# Patient Record
Sex: FEMALE | Race: OTHER | Employment: FULL TIME | URBAN - METROPOLITAN AREA
[De-identification: names, ages, dates, MRNs, and addresses within clinical notes are randomized per-mention and may not be internally consistent; named-entity substitution may affect disease eponyms.]

---

## 2017-04-11 ENCOUNTER — OFFICE VISIT (OUTPATIENT)
Dept: FAMILY MEDICINE CLINIC | Facility: CLINIC | Age: 47
End: 2017-04-11

## 2017-04-11 VITALS
DIASTOLIC BLOOD PRESSURE: 67 MMHG | BODY MASS INDEX: 39.2 KG/M2 | SYSTOLIC BLOOD PRESSURE: 103 MMHG | WEIGHT: 213 LBS | TEMPERATURE: 98.7 F | HEART RATE: 90 BPM | RESPIRATION RATE: 18 BRPM | HEIGHT: 62 IN | OXYGEN SATURATION: 97 %

## 2017-04-11 DIAGNOSIS — J20.9 ACUTE BRONCHITIS, UNSPECIFIED ORGANISM: ICD-10-CM

## 2017-04-11 DIAGNOSIS — R68.89 FLU-LIKE SYMPTOMS: ICD-10-CM

## 2017-04-11 DIAGNOSIS — E55.9 VITAMIN D DEFICIENCY: ICD-10-CM

## 2017-04-11 DIAGNOSIS — H61.23 BILATERAL IMPACTED CERUMEN: ICD-10-CM

## 2017-04-11 DIAGNOSIS — Z00.00 ROUTINE GENERAL MEDICAL EXAMINATION AT A HEALTH CARE FACILITY: Primary | ICD-10-CM

## 2017-04-11 LAB
FLUAV+FLUBV AG NOSE QL IA.RAPID: NEGATIVE POS/NEG
FLUAV+FLUBV AG NOSE QL IA.RAPID: NEGATIVE POS/NEG
S PYO AG THROAT QL: NEGATIVE
VALID INTERNAL CONTROL?: YES
VALID INTERNAL CONTROL?: YES

## 2017-04-11 RX ORDER — LORAZEPAM 0.5 MG/1
TABLET ORAL
Refills: 0 | COMMUNITY
Start: 2017-03-21 | End: 2018-02-13

## 2017-04-11 RX ORDER — NAPROXEN 375 MG/1
375 TABLET ORAL 2 TIMES DAILY WITH MEALS
Qty: 180 TAB | Refills: 3 | Status: SHIPPED | OUTPATIENT
Start: 2017-04-11 | End: 2018-02-13

## 2017-04-11 RX ORDER — HYDROCODONE POLISTIREX AND CHLORPHENIRAMINE POLISTIREX 10; 8 MG/5ML; MG/5ML
1 SUSPENSION, EXTENDED RELEASE ORAL
Qty: 115 ML | Refills: 0 | Status: SHIPPED | OUTPATIENT
Start: 2017-04-11 | End: 2017-08-18 | Stop reason: ALTCHOICE

## 2017-04-11 NOTE — PROGRESS NOTES
Edita Tam is a 50 y.o. female presents today for chest cough, chills, headache, and sore throat since yesterday. Patient reports that she had no appetite yesterday. Patient is not fasting. Pt is in Room # 2        1. Have you been to the ER, urgent care clinic since your last visit? Hospitalized since your last visit? No    2. Have you seen or consulted any other health care providers outside of the [address] [place] [address] since your last visit? Include any pap smears or colon screening.  No      reviewed:

## 2017-04-11 NOTE — PROGRESS NOTES
History and Physical    Today's Date:  2017   Patient's Name: Melanie Milner   Patient's :  1970     History:     Chief Complaint   Patient presents with    Cough    Chills    Headache    Sore Throat     Cough   This is an acute problem. This is not at goal. Symptoms started two days ago. Pt denies fever, sick contacts or rhinorrhea.  +nasal congestion. +h/o allergies    Obesity Class II  This is a chronic problem. This is not at goal. Pt exercises regularly. Pt eats a healthy diet. Hypovitaminosis D  This is a chronic problem. This is not at goal. Vit D level was checked on 2014. Pt is on vitamin D supplements. Past Medical History:   Diagnosis Date    Acute bronchitis 2017    Acute right-sided thoracic back pain 2016    Breast pain, right 12/10/2014    Excess ear wax 2014    Fatigue 2014    Fibrocystic breast disease in female 2014    Iron deficiency anemia 12/10/2014    Menorrhagia 2016    Neck pain 2016    Obesity, Class II, BMI 35-39.9, no comorbidity ([address] 2014    Pollen allergies 2014    Skin tag 2014    Tinea corporis 2014    Vitamin D deficiency 2014     Past Surgical History:   Procedure Laterality Date    HX BREAST REDUCTION Bilateral 2015    BREAST REDUCTION - bilateral performed by Lorraine Conde [facility] at [facility] MA[facility] OR    HX GI      cholecystectomy    HX GYN      C section x2      reports that she has never smoked. She has never used smokeless tobacco. She reports that she drinks alcohol. She reports that she does not use illicit drugs.   Family History   Problem Relation Age of Onset    No Known Problems Mother     No Known Problems Father     No Known Problems Sister     No Known Problems Brother     No Known Problems Maternal Grandmother     Diabetes Paternal Grandmother     Lung Disease Paternal Grandfather      pneumonia    No Known Problems Brother      Allergies   Allergen Reactions  Hydrocodone-Acetaminophen Itching     Patient denies being allergic to this medication    Mustard Hives     Problem List:      Patient Active Problem List   Diagnosis Code    Obesity, Class II, BMI 35-39.9, no comorbidity ([facility]) E66.9    Pollen allergies J30.1    Vitamin D deficiency E55.9    Skin tag L91.8    Excess ear wax H61.20    Fibrocystic breast disease in female N62.18    Iron deficiency anemia D50.9    Menorrhagia N92.0    Acute bronchitis J20.9     Medications:     Current Outpatient Prescriptions   Medication Sig    LORazepam (ATIVAN) 0.5 mg tablet TK 1 T PO QD PRN    chlorpheniramine-HYDROcodone (TUSSIONEX) 10-8 mg/5 mL suspension Take 5 mL by mouth every twelve (12) hours as needed for Cough. Max Daily Amount: 10 mL.  carbamide peroxide (DEBROX) 6.5 % otic solution Administer 5 Drops into each ear two (2) times a day.  naproxen (NAPROSYN) 375 mg tablet Take 1 Tab by mouth two (2) times daily (with meals).  diazepam (VALIUM) 10 mg tablet Take 1 Tab by mouth every six (6) hours as needed for Anxiety. Max Daily Amount: 40 mg.    ferrous sulfate (IRON) 325 mg (65 mg iron) tablet Take 1 Tab by mouth Daily (before breakfast).  ascorbic acid (VITAMIN C) 100 mg tab Take 1 Tab by mouth three (3) times daily.  ergocalciferol (ERGOCALCIFEROL) 50,000 unit capsule Take 1 Cap by mouth every seven (7) days.  multivitamin (ONE A DAY) tablet Take 1 Tab by mouth daily. No current facility-administered medications for this visit.       Review of Systems:   (Positives in bold)   General:   fevers, chills, generalized weakness, fatigue, weight change, night sweats, appetite decreased  Neurologic: dizziness, lightheadedness, headaches, loss of consciousness, numbness, tingling, focal weakness  Eyes:  vision changes, double vision, photophobia  Ears:  change in hearing, ear pain, ear discharge, ear ringing  Nose:  sneezing, runny nose, nasal congestion  Mouth/Throat: sore throat, voice change, dry mouth, difficulty swallowing  Neck:  pain, stiffness, swelling  Respiratory: dyspnea at rest, dyspnea on exertion, wheezing, cough, sputum production  Cardiovascular:   chest pain, palpitations, pedal edema, leg cramps  Breasts: lumps, discharge, pain, rash, skin changes, changes on self-exam  Gastrointestinal:  nausea, vomiting, abdominal pain, constipation, diarrhea, heart burn, bloody stools, tarry black stools, rectal pain, hemorrhoids  Urinary: dysuria, urinary frequency, nocturia, malodorous urine  Genital (F): vaginal discharge, ulcerations, rashes, change in menses, pelvic pain  Musculoskeletal:  joint pain, joint stiffness, joint swelling, back pain, focal muscle pain, diffuse myalgias  Psychiatric: insomnia, anxiety, depression, hallucinations, suicidal ideation, homicidal ideation  Endocrine: polydipsia, polyuria, polyphagia, cold intolerance, heat intolerance  Hematologic: easy bruising, easy bleeding  Dermatologic: Itching, rash    Physical Assessment:   VS:    Visit Vitals    /67 (BP 1 Location: Right arm, BP Patient Position: Sitting)    Pulse 90    Temp 98.7 °F (37.1 °C) (Oral)    Resp 18    Ht 5' 2\" (1.575 m)    Wt 213 lb (96.6 kg)    LMP 03/20/2017    SpO2 97%    BMI 38.96 kg/m2     General:   Well-groomed, well-nourished, in no distress, pleasant, alert, appropriate and conversant. Eyes:    PERRL, EOMI  Ears:  TMs could not be visualized, +ear wax bilaterally  Mouth:  MMM, good dentition, oropharynx WNL without membranes, exudates, petechiae or ulcers  Neck:   Neck supple, no swelling, mass or tenderness  Cardiovascular:   No JVD. RRR, no MRG. Pulmonary:   Lungs clear bilaterally. Normal respiratory effort. Abdomen:   Abdomen soft, NT, ND, NAB. Extremities:   No edema, LEs warm and well-perfused. Neuro:   Alert and oriented, no focal deficits. No facial asymmetry noted.   Skin:    No rash or jaundice  MSK:   Normal ROM, 5/5 muscle strength  Psych:  No pressured speech or abnormal thought content    Strep test: neg  Flu A/B: neg    Assessment/Plan & Orders:         ICD-10-CM ICD-9-CM    1. Routine general medical examination at a health care facility Z00.00 V70.0 CBC WITH AUTOMATED DIFF      LIPID PANEL      METABOLIC PANEL, COMPREHENSIVE      TSH 3RD GENERATION      T4, FREE      HEMOGLOBIN A1C WITH EAG   2. Acute bronchitis, unspecified organism J20.9 466.0 chlorpheniramine-HYDROcodone (TUSSIONEX) 10-8 mg/5 mL suspension   3. Flu-like symptoms R68.89 780.99 AMB POC RAPID STREP A      AMB POC KRYSTAL INFLUENZA A/B TEST   4. Bilateral impacted cerumen H61.23 380.4 carbamide peroxide (DEBROX) 6.5 % otic solution   5. Vitamin D deficiency E55.9 268.9 VITAMIN D, 25 HYDROXY     HM  Colon cancer: colonoscopy due at age 46  Dyslipidemia: will check FLP and CMP  Diabetes mellitus: will check FBG  Influenza vaccine: due, pt declines  Pneumococcal vaccine: due at age 70  Tdap:   Herpes Zoster vaccine: due at age 59  Hep B vaccine: not indicated (liver dz, DM 19-59)  Weight:  Body mass index is 38.96 kg/(m^2). Discussed the patient's BMI with her. The BMI follow up plan is as follows: Improve diet and 30 min of moderate activity at least 5 times a week  Cervical cancer:  pap smear , due 2017  Breast Cancer: mammogram due, pt will schedule  Osteoporosis: No indication for DEXA scan     reviewed  Advised pt not to take tussionex with her benzodiazepines as these medications may cause her to feel drowsy. Pt agreed    Follow-up Disposition:  Return in about 2 months (around 2017) for Well woman exam, 30 minutes, Go over lab/imaging results, Follow up. *Patient verbalized understanding and agreement with the plan. Patient was given an after-visit summary. Aliza Márquez.  Guanako Breen MD - Internal Medicine  2017, 3:09 PM  [facility]  [address] [place], [address]  Phone [phone]  Fax [phone]

## 2017-04-11 NOTE — PATIENT INSTRUCTIONS
Bronchitis: Care Instructions  Your Care Instructions    Bronchitis is inflammation of the bronchial tubes, which carry air to the lungs. The tubes swell and produce mucus, or phlegm. The mucus and inflamed bronchial tubes make you cough. You may have trouble breathing. Most cases of bronchitis are caused by viruses like those that cause colds. Antibiotics usually do not help and they may be harmful. Bronchitis usually develops rapidly and lasts about 2 to 3 weeks in otherwise healthy people. Follow-up care is a key part of your treatment and safety. Be sure to make and go to all appointments, and call your doctor if you are having problems. It's also a good idea to know your test results and keep a list of the medicines you take. How can you care for yourself at home? · Take all medicines exactly as prescribed. Call your doctor if you think you are having a problem with your medicine. · Get some extra rest.  · Take an over-the-counter pain medicine, such as acetaminophen (Tylenol), ibuprofen (Advil, Motrin), or naproxen (Aleve) to reduce fever and relieve body aches. Read and follow all instructions on the label. · Do not take two or more pain medicines at the same time unless the doctor told you to. Many pain medicines have acetaminophen, which is Tylenol. Too much acetaminophen (Tylenol) can be harmful. · Take an over-the-counter cough medicine that contains dextromethorphan to help quiet a dry, hacking cough so that you can sleep. Avoid cough medicines that have more than one active ingredient. Read and follow all instructions on the label. · Breathe moist air from a humidifier, hot shower, or sink filled with hot water. The heat and moisture will thin mucus so you can cough it out. · Do not smoke. Smoking can make bronchitis worse. If you need help quitting, talk to your doctor about stop-smoking programs and medicines. These can increase your chances of quitting for good.   When should you call for help? Call 911 anytime you think you may need emergency care. For example, call if:  · You have severe trouble breathing. Call your doctor now or seek immediate medical care if:  · You have new or worse trouble breathing. · You cough up dark brown or bloody mucus (sputum). · You have a new or higher fever. · You have a new rash. Watch closely for changes in your health, and be sure to contact your doctor if:  · You cough more deeply or more often, especially if you notice more mucus or a change in the color of your mucus. · You are not getting better as expected. Where can you learn more? Go to http://donavan-ty.info/. Enter H333 in the search box to learn more about \"Bronchitis: Care Instructions. \"  Current as of: May 23, 2016  Content Version: 11.2  © 8448-8330 [employer]. Care instructions adapted under license by [employer] (which disclaims liability or warranty for this information). If you have questions about a medical condition or this instruction, always ask your healthcare professional. [address] any warranty or liability for your use of this information.

## 2017-04-11 NOTE — MR AVS SNAPSHOT
Visit Information Date & Time Provider Department Dept. Phone Encounter #  
 4/11/2017  2:45 PM Weldon Felty, MD [facility] [phone] [id number] Follow-up Instructions Return in about 2 months (around 6/11/2017) for Well woman exam, 30 minutes, Go over lab/imaging results, Follow up. Upcoming Health Maintenance Date Due  
 PAP AKA CERVICAL CYTOLOGY 6/26/2017 DTaP/Tdap/Td series (2 - Td) 6/23/2026 Allergies as of 4/11/2017  Review Complete On: 4/11/2017 By: Alvino Bob LPN Severity Noted Reaction Type Reactions Hydrocodone-acetaminophen  06/28/2016    Itching Patient denies being allergic to this medication Mustard  06/26/2014    Hives Current Immunizations  Never Reviewed Name Date Rubella Virus Vaccine 5/16/2008 12:00 AM  
  
 Not reviewed this visit You Were Diagnosed With   
  
 Codes Comments Routine general medical examination at a health care facility    -  Primary ICD-10-CM: Z00.00 ICD-9-CM: V70.0 Acute bronchitis, unspecified organism     ICD-10-CM: J20.9 ICD-9-CM: 466.0 Flu-like symptoms     ICD-10-CM: R68.89 ICD-9-CM: 780.99 Bilateral impacted cerumen     ICD-10-CM: H61.23 
ICD-9-CM: 380.4 Vitals BP Pulse Temp Resp Height(growth percentile) Weight(growth percentile) 103/67 (BP 1 Location: Right arm, BP Patient Position: Sitting) 90 98.7 °F (37.1 °C) (Oral) 18 5' 2\" (1.575 m) 213 lb (96.6 kg) LMP SpO2 BMI OB Status Smoking Status 03/20/2017 97% 38.96 kg/m2 Having regular periods Never Smoker BMI and BSA Data Body Mass Index Body Surface Area  
 38.96 kg/m 2 2.06 m 2 Preferred Pharmacy Pharmacy Name Phone [facility] [place] [address] [phone] Your Updated Medication List  
  
   
This list is accurate as of: 4/11/17  3:47 PM.  Always use your most recent med list.  
  
 acyclovir 5 % ointment Commonly known as:  ZOVIRAX Apply  to affected area every three (3) hours. ascorbic acid (vitamin C) 100 mg tab Commonly known as:  VITAMIN C Take 1 Tab by mouth three (3) times daily. carbamide peroxide 6.5 % otic solution Commonly known as:  Sheryl Bar Administer 5 Drops into each ear two (2) times a day. chlorpheniramine-HYDROcodone 10-8 mg/5 mL suspension Commonly known as:  Lyric Muprhy Take 5 mL by mouth every twelve (12) hours as needed for Cough. Max Daily Amount: 10 mL. diazePAM 10 mg tablet Commonly known as:  VALIUM Take 1 Tab by mouth every six (6) hours as needed for Anxiety. Max Daily Amount: 40 mg.  
  
 ergocalciferol 50,000 unit capsule Commonly known as:  ERGOCALCIFEROL Take 1 Cap by mouth every seven (7) days. ferrous sulfate 325 mg (65 mg iron) tablet Commonly known as:  Iron Take 1 Tab by mouth Daily (before breakfast). LORazepam 0.5 mg tablet Commonly known as:  ATIVAN TK 1 T PO QD PRN  
  
 multivitamin tablet Commonly known as:  ONE A DAY Take 1 Tab by mouth daily. naproxen 375 mg tablet Commonly known as:  NAPROSYN Take 1 Tab by mouth two (2) times daily (with meals). phentermine 37.5 mg tablet Commonly known as:  ADIPEX-P Take 37.5 mg by mouth every morning. Indications: WEIGHT LOSS MANAGEMENT FOR OBESE PATIENT (BMI >= 30) predniSONE 10 mg tablet Commonly known as:  DELTASONE  
4 tabs for 2 days, then 3 tabs for 2 days, then 2 tabs for 1 day, then 1 tab until gone. Prescriptions Printed Refills  
 chlorpheniramine-HYDROcodone (TUSSIONEX) 10-8 mg/5 mL suspension 0 Sig: Take 5 mL by mouth every twelve (12) hours as needed for Cough. Max Daily Amount: 10 mL. Class: Print Route: Oral  
  
Prescriptions Sent to Pharmacy  Refills  
 carbamide peroxide (DEBROX) 6.5 % otic solution 3  
 Sig: Administer 5 Drops into each ear two (2) times a day. Class: Normal  
 Pharmacy: [facility] [address] Ph #: [phone] Route: Both Ears  
 naproxen (NAPROSYN) 375 mg tablet 3 Sig: Take 1 Tab by mouth two (2) times daily (with meals). Class: Normal  
 Pharmacy: [facility] [address] Ph #: [phone] Route: Oral  
  
We Performed the Following AMB POC RAPID STREP A [15156 CPT(R)] AMB POC KRYSTAL INFLUENZA A/B TEST [73006 CPT(R)] Follow-up Instructions Return in about 2 months (around 6/11/2017) for Well woman exam, 30 minutes, Go over lab/imaging results, Follow up. To-Do List   
 04/11/2017 Lab:  HEMOGLOBIN A1C WITH EAG   
  
 04/11/2017 Lab:  T4, FREE   
  
 04/11/2017 Lab:  TSH 3RD GENERATION   
  
 04/11/2017 Lab:  VITAMIN D, 25 HYDROXY   
  
 04/14/2017 Lab:  CBC WITH AUTOMATED DIFF   
  
 04/14/2017 Lab:  LIPID PANEL   
  
 04/14/2017 Lab:  METABOLIC PANEL, COMPREHENSIVE Introducing [facility]! Marleen Schwartz introduces [employer] patient portal. Now you can access parts of your medical record, email your doctor's office, and request medication refills online. 1. In your internet browser, go to [email or web address] 2. Click on the First Time User? Click Here link in the Sign In box. You will see the New Member Sign Up page. 3. Enter your [employer] Access Code exactly as it appears below. You will not need to use this code after youve completed the sign-up process. If you do not sign up before the expiration date, you must request a new code. · [employer] Access Code: [email or web address] Expires: 7/10/2017  3:46 PM 
 
4. Enter the last four digits of your Social Security Number (xxxx) and Date of Birth (mm/dd/yyyy) as indicated and click Submit.  You will be taken to the next sign-up page. 5. Create a [employer] ID. This will be your [employer] login ID and cannot be changed, so think of one that is secure and easy to remember. 6. Create a [employer] password. You can change your password at any time. 7. Enter your Password Reset Question and Answer. This can be used at a later time if you forget your password. 8. Enter your e-mail address. You will receive e-mail notification when new information is available in [address] 9. Click Sign Up. You can now view and download portions of your medical record. 10. Click the Download Summary menu link to download a portable copy of your medical information. If you have questions, please visit the Frequently Asked Questions section of the [employer] website. Remember, [employer] is NOT to be used for urgent needs. For medical emergencies, dial 911. Now available from your iPhone and Android! Please provide this summary of care documentation to your next provider. Your primary care clinician is listed as Mary Blank. If you have any questions after today's visit, please call [phone].

## 2017-08-18 ENCOUNTER — OFFICE VISIT (OUTPATIENT)
Dept: FAMILY MEDICINE CLINIC | Facility: CLINIC | Age: 47
End: 2017-08-18

## 2017-08-18 VITALS
SYSTOLIC BLOOD PRESSURE: 129 MMHG | RESPIRATION RATE: 12 BRPM | OXYGEN SATURATION: 95 % | HEIGHT: 62 IN | WEIGHT: 201.6 LBS | BODY MASS INDEX: 37.1 KG/M2 | TEMPERATURE: 97.7 F | DIASTOLIC BLOOD PRESSURE: 77 MMHG | HEART RATE: 82 BPM

## 2017-08-18 DIAGNOSIS — M54.50 ACUTE RIGHT-SIDED LOW BACK PAIN WITHOUT SCIATICA: Primary | ICD-10-CM

## 2017-08-18 RX ORDER — METFORMIN HYDROCHLORIDE 500 MG/1
TABLET ORAL 2 TIMES DAILY
Refills: 0 | COMMUNITY
Start: 2017-07-15 | End: 2018-02-13

## 2017-08-18 RX ORDER — CYCLOBENZAPRINE HCL 5 MG
5 TABLET ORAL
Qty: 30 TAB | Refills: 0 | Status: SHIPPED | OUTPATIENT
Start: 2017-08-18 | End: 2018-02-13

## 2017-08-18 RX ORDER — TOPIRAMATE 50 MG/1
2 TABLET, FILM COATED ORAL DAILY
Refills: 0 | COMMUNITY
Start: 2017-07-15 | End: 2018-02-13

## 2017-08-18 NOTE — MR AVS SNAPSHOT
Visit Information Date & Time Provider Department Dept. Phone Encounter #  
 8/18/2017 10:30 AM [facility], [facility] [phone] [id number] Follow-up Instructions Return if symptoms worsen or fail to improve. Upcoming Health Maintenance Date Due  
 PAP AKA CERVICAL CYTOLOGY 6/26/2017 INFLUENZA AGE 9 TO ADULT 8/1/2017 DTaP/Tdap/Td series (2 - Td) 6/23/2026 Allergies as of 8/18/2017  Review Complete On: 8/18/2017 By: [facility], ISMAEL Severity Noted Reaction Type Reactions Hydrocodone-acetaminophen  06/28/2016    Itching Patient denies being allergic to this medication Mustard  06/26/2014    Hives Current Immunizations  Never Reviewed Name Date Rubella Virus Vaccine 5/16/2008 12:00 AM  
  
 Not reviewed this visit You Were Diagnosed With   
  
 Codes Comments Acute right-sided low back pain without sciatica    -  Primary ICD-10-CM: M54.5 ICD-9-CM: 724.2 Vitals BP Pulse Temp Resp Height(growth percentile) Weight(growth percentile) 129/77 (BP 1 Location: Right arm, BP Patient Position: Sitting) 82 97.7 °F (36.5 °C) (Oral) 12 5' 2\" (1.575 m) 201 lb 9.6 oz (91.4 kg) LMP SpO2 BMI OB Status Smoking Status 07/20/2017 95% 36.87 kg/m2 Having regular periods Never Smoker Vitals History BMI and BSA Data Body Mass Index Body Surface Area  
 36.87 kg/m 2 2 m 2 Preferred Pharmacy Pharmacy Name Phone [facility] [address] [phone] Your Updated Medication List  
  
   
This list is accurate as of: 8/18/17 11:51 AM.  Always use your most recent med list.  
  
  
  
  
 ascorbic acid (vitamin C) 100 mg tab Commonly known as:  VITAMIN C Take 1 Tab by mouth three (3) times daily. carbamide peroxide 6.5 % otic solution Commonly known as:  Joan Magic Administer 5 Drops into each ear two (2) times a day. cyclobenzaprine 5 mg tablet Commonly known as:  FLEXERIL Take 1 Tab by mouth three (3) times daily (with meals). diazePAM 10 mg tablet Commonly known as:  VALIUM Take 1 Tab by mouth every six (6) hours as needed for Anxiety. Max Daily Amount: 40 mg.  
  
 ergocalciferol 50,000 unit capsule Commonly known as:  ERGOCALCIFEROL Take 1 Cap by mouth every seven (7) days. ferrous sulfate 325 mg (65 mg iron) tablet Commonly known as:  Iron Take 1 Tab by mouth Daily (before breakfast). LORazepam 0.5 mg tablet Commonly known as:  ATIVAN TK 1 T PO QD PRN  
  
 metFORMIN 500 mg tablet Commonly known as:  GLUCOPHAGE Take  by mouth two (2) times a day. multivitamin tablet Commonly known as:  ONE A DAY Take 1 Tab by mouth daily. naproxen 375 mg tablet Commonly known as:  NAPROSYN Take 1 Tab by mouth two (2) times daily (with meals). topiramate 50 mg tablet Commonly known as:  TOPAMAX Take 2 Tabs by mouth daily. Prescriptions Sent to Pharmacy Refills  
 cyclobenzaprine (FLEXERIL) 5 mg tablet 0 Sig: Take 1 Tab by mouth three (3) times daily (with meals). Class: Normal  
 Pharmacy: [facility] [address] #: [phone] Route: Oral  
  
Follow-up Instructions Return if symptoms worsen or fail to improve. Patient Instructions Back Stretches: Exercises Your Care Instructions Here are some examples of exercises for stretching your back. Start each exercise slowly. Ease off the exercise if you start to have pain. Your doctor or physical therapist will tell you when you can start these exercises and which ones will work best for you. How to do the exercises Overhead stretch 1. Stand comfortably with your feet shoulder-width apart. 2. Looking straight ahead, raise both arms over your head and reach toward the ceiling. Do not allow your head to tilt back. 3. Hold for 15 to 30 seconds, then lower your arms to your sides. 4. Repeat 2 to 4 times. Side stretch 1. Stand comfortably with your feet shoulder-width apart. 2. Raise one arm over your head, and then lean to the other side. 3. Slide your hand down your leg as you let the weight of your arm gently stretch your side muscles. Hold for 15 to 30 seconds. 4. Repeat 2 to 4 times on each side. Press-up 1. Lie on your stomach, supporting your body with your forearms. 2. Press your elbows down into the floor to raise your upper back. As you do this, relax your stomach muscles and allow your back to arch without using your back muscles. As your press up, do not let your hips or pelvis come off the floor. 3. Hold for 15 to 30 seconds, then relax. 4. Repeat 2 to 4 times. Relax and rest 
 
1. Lie on your back with a rolled towel under your neck and a pillow under your knees. Extend your arms comfortably to your sides. 2. Relax and breathe normally. 3. Remain in this position for about 10 minutes. 4. If you can, do this 2 or 3 times each day. Follow-up care is a key part of your treatment and safety. Be sure to make and go to all appointments, and call your doctor if you are having problems. It's also a good idea to know your test results and keep a list of the medicines you take. Where can you learn more? Go to http://donavan-ty.info/. Enter O745 in the search box to learn more about \"Back Stretches: Exercises. \" Current as of: March 21, 2017 Content Version: 11.3 © 6723-4794 [employer]. Care instructions adapted under license by [employer] (which disclaims liability or warranty for this information).  If you have questions about a medical condition or this instruction, always ask your healthcare professional. [employer] disclaims any warranty or liability for your use of this information. Introducing [facility]! Dale Felix introduces [employer] patient portal. Now you can access parts of your medical record, email your doctor's office, and request medication refills online. 1. In your internet browser, go to [email or web address] 2. Click on the First Time User? Click Here link in the Sign In box. You will see the New Member Sign Up page. 3. Enter your [employer] Access Code exactly as it appears below. You will not need to use this code after youve completed the sign-up process. If you do not sign up before the expiration date, you must request a new code. · [employer] Access Code: [email or web address] Expires: 11/16/2017 10:44 AM 
 
4. Enter the last four digits of your Social Security Number (xxxx) and Date of Birth (mm/dd/yyyy) as indicated and click Submit. You will be taken to the next sign-up page. 5. Create a [employer] ID. This will be your [employer] login ID and cannot be changed, so think of one that is secure and easy to remember. 6. Create a [employer] password. You can change your password at any time. 7. Enter your Password Reset Question and Answer. This can be used at a later time if you forget your password. 8. Enter your e-mail address. You will receive e-mail notification when new information is available in [address] 9. Click Sign Up. You can now view and download portions of your medical record. 10. Click the Download Summary menu link to download a portable copy of your medical information. If you have questions, please visit the Frequently Asked Questions section of the [employer] website. Remember, [employer] is NOT to be used for urgent needs. For medical emergencies, dial 911. Now available from your iPhone and Android! Please provide this summary of care documentation to your next provider. Your primary care clinician is listed as Milan Kramer. If you have any questions after today's visit, please call [phone].

## 2017-08-18 NOTE — PROGRESS NOTES
Today's Date:  2017   Patient:  Tracy William  Patient :  1970    Subjective:   Tracy William is a 50 y.o. female who presents for Acute Right Low Back Pain. States that she started felling symptoms about a week ago. Does not remember doing anything that would have caused symptom. States that pain is intermittent and when it comes, it is sharp and radiates upward. Sitting for a long time causes discomfort. Rates pain 6/10. Has taken Naproxen with some relief. Current Outpatient Meds and Allergies     Current Outpatient Prescriptions on File Prior to Visit   Medication Sig Dispense Refill    ascorbic acid (VITAMIN C) 100 mg tab Take 1 Tab by mouth three (3) times daily. 270 Tab 4    ergocalciferol (ERGOCALCIFEROL) 50,000 unit capsule Take 1 Cap by mouth every seven (7) days. 12 Cap 3    multivitamin (ONE A DAY) tablet Take 1 Tab by mouth daily.  LORazepam (ATIVAN) 0.5 mg tablet TK 1 T PO QD PRN  0    chlorpheniramine-HYDROcodone (TUSSIONEX) 10-8 mg/5 mL suspension Take 5 mL by mouth every twelve (12) hours as needed for Cough. Max Daily Amount: 10 mL. 115 mL 0    carbamide peroxide (DEBROX) 6.5 % otic solution Administer 5 Drops into each ear two (2) times a day. 30 mL 3    naproxen (NAPROSYN) 375 mg tablet Take 1 Tab by mouth two (2) times daily (with meals). 180 Tab 3    diazepam (VALIUM) 10 mg tablet Take 1 Tab by mouth every six (6) hours as needed for Anxiety. Max Daily Amount: 40 mg. 20 Tab 0    ferrous sulfate (IRON) 325 mg (65 mg iron) tablet Take 1 Tab by mouth Daily (before breakfast). 270 Tab 4     No current facility-administered medications on file prior to visit. These medications have been reviewed and reconciled with the patient during today's visit.       Allergies   Allergen Reactions    Hydrocodone-Acetaminophen Itching     Patient denies being allergic to this medication    Mustard Hives         ROS:     CONST:   Denies fatigue, weight change, appetite change NEURO:   Denies headaches, vision changes, dizziness, loss of consciousness, denies numbness or tingling  CV:      Denies chest pain, palpitations, orthopnea, PND  PULM:  Denies SOB, wheezing, cough, hemoptysis  :       Denies dysuria, hematuria, change in urine  MS:      Denies muscle/joint pain, joint swelling    Objective:     VS:    Visit Vitals    /77 (BP 1 Location: Right arm, BP Patient Position: Sitting)    Pulse 82    Temp 97.7 °F (36.5 °C) (Oral)    Resp 12    Ht 5' 2\" (1.575 m)    Wt 201 lb 9.6 oz (91.4 kg)    LMP 07/20/2017    SpO2 95%    BMI 36.87 kg/m2       General:   Well-nourished, well-groomed, pleasant, alert, in no acute distress. Cardiovasc:   Regular rate and rhythm, no murmurs, no rubs, no gallops,   Pulmonary:   Clear breath sounds bilaterally, good air movement, no wheezing, no rales, no rhonchi, normal respiratory effort  MS:   Impaired movement, Right lower back TTP, no redness, no edema, skin warm and well-perfused  Neuro:   Alert, conversant, appropriate, following commands, no focal deficits. Assessment:       1. Acute right-sided low back pain without sciatica        Plan:       Orders Placed This Encounter    metFORMIN (GLUCOPHAGE) 500 mg tablet     Sig: Take  by mouth two (2) times a day. Refill:  0    topiramate (TOPAMAX) 50 mg tablet     Sig: Take 2 Tabs by mouth daily. Refill:  0    cyclobenzaprine (FLEXERIL) 5 mg tablet     Sig: Take 1 Tab by mouth three (3) times daily (with meals). Dispense:  30 Tab     Refill:  0       I have discussed the diagnosis with the patient and the intended plan as seen in the above orders. The patient has received an after-visit summary along with patient information handout. I have discussed medication side effects and warnings with the patient as well. Pt verbalized understanding. Follow-up Disposition:  Return if symptoms worsen or fail to improve.   ISMAEL Helton  8/18/2017, 11:37 AM

## 2017-08-18 NOTE — PATIENT INSTRUCTIONS
Back Stretches: Exercises  Your Care Instructions  Here are some examples of exercises for stretching your back. Start each exercise slowly. Ease off the exercise if you start to have pain. Your doctor or physical therapist will tell you when you can start these exercises and which ones will work best for you. How to do the exercises  Overhead stretch    1. Stand comfortably with your feet shoulder-width apart. 2. Looking straight ahead, raise both arms over your head and reach toward the ceiling. Do not allow your head to tilt back. 3. Hold for 15 to 30 seconds, then lower your arms to your sides. 4. Repeat 2 to 4 times. Side stretch    1. Stand comfortably with your feet shoulder-width apart. 2. Raise one arm over your head, and then lean to the other side. 3. Slide your hand down your leg as you let the weight of your arm gently stretch your side muscles. Hold for 15 to 30 seconds. 4. Repeat 2 to 4 times on each side. Press-up    1. Lie on your stomach, supporting your body with your forearms. 2. Press your elbows down into the floor to raise your upper back. As you do this, relax your stomach muscles and allow your back to arch without using your back muscles. As your press up, do not let your hips or pelvis come off the floor. 3. Hold for 15 to 30 seconds, then relax. 4. Repeat 2 to 4 times. Relax and rest    1. Lie on your back with a rolled towel under your neck and a pillow under your knees. Extend your arms comfortably to your sides. 2. Relax and breathe normally. 3. Remain in this position for about 10 minutes. 4. If you can, do this 2 or 3 times each day. Follow-up care is a key part of your treatment and safety. Be sure to make and go to all appointments, and call your doctor if you are having problems. It's also a good idea to know your test results and keep a list of the medicines you take. Where can you learn more? Go to http://donavan-ty.info/.   Enter J590 in the search box to learn more about \"Back Stretches: Exercises. \"  Current as of: March 21, 2017  Content Version: 11.3  © 6221-7103 [employer]. Care instructions adapted under license by [employer] (which disclaims liability or warranty for this information). If you have questions about a medical condition or this instruction, always ask your healthcare professional. [place] [address] any warranty or liability for your use of this information.

## 2017-08-18 NOTE — PROGRESS NOTES
Patient in office today for Back spasms. Patient is not fasting. Patient in room # 8.       1. Have you been to the ER, urgent care clinic since your last visit? Hospitalized since your last visit? No    2. Have you seen or consulted any other health care providers outside of the [address] [place] [address] since your last visit? Include any pap smears or colon screening. Yes When: 07/15/2017 [facility] Reason for visit: Weight loss counseling     Reviewed.

## 2018-02-13 ENCOUNTER — OFFICE VISIT (OUTPATIENT)
Dept: FAMILY MEDICINE CLINIC | Facility: CLINIC | Age: 48
End: 2018-02-13

## 2018-02-13 VITALS
TEMPERATURE: 97.9 F | HEIGHT: 62 IN | DIASTOLIC BLOOD PRESSURE: 90 MMHG | WEIGHT: 210.8 LBS | RESPIRATION RATE: 12 BRPM | OXYGEN SATURATION: 99 % | SYSTOLIC BLOOD PRESSURE: 148 MMHG | BODY MASS INDEX: 38.79 KG/M2 | HEART RATE: 65 BPM

## 2018-02-13 DIAGNOSIS — D50.0 IRON DEFICIENCY ANEMIA DUE TO CHRONIC BLOOD LOSS: ICD-10-CM

## 2018-02-13 DIAGNOSIS — E55.9 VITAMIN D DEFICIENCY: ICD-10-CM

## 2018-02-13 DIAGNOSIS — Z13.31 SCREENING FOR DEPRESSION: ICD-10-CM

## 2018-02-13 DIAGNOSIS — R52 BODY ACHES: Primary | ICD-10-CM

## 2018-02-13 DIAGNOSIS — E66.9 OBESITY, CLASS II, BMI 35-39.9, NO COMORBIDITY: ICD-10-CM

## 2018-02-13 PROBLEM — J20.9 ACUTE BRONCHITIS: Status: RESOLVED | Noted: 2017-04-11 | Resolved: 2018-02-13

## 2018-02-13 RX ORDER — GUAIFENESIN 1200 MG/1
TABLET, EXTENDED RELEASE ORAL
Refills: 0 | COMMUNITY
Start: 2018-02-09 | End: 2019-07-30

## 2018-02-13 RX ORDER — OSELTAMIVIR PHOSPHATE 75 MG/1
CAPSULE ORAL
Refills: 0 | COMMUNITY
Start: 2018-02-09 | End: 2019-07-30

## 2018-02-13 RX ORDER — TIZANIDINE 4 MG/1
TABLET ORAL
Refills: 0 | COMMUNITY
Start: 2018-02-09 | End: 2019-03-11 | Stop reason: ALTCHOICE

## 2018-02-13 RX ORDER — IBUPROFEN 800 MG/1
800 TABLET ORAL
Qty: 90 TAB | Refills: 3 | Status: SHIPPED | OUTPATIENT
Start: 2018-02-13 | End: 2018-03-15

## 2018-02-13 NOTE — MR AVS SNAPSHOT
[address] [address] [phone] Patient: Daphnie Jiménez MRN: [id number] [facility]:[birth year] Visit Information Date & Time Provider Department Dept. Phone Encounter #  
 2/13/2018  2:45 PM Rancho Mabry MD [facility] [phone] [id number] Follow-up Instructions Return in about 4 weeks (around 3/13/2018) for Well woman exam, 30 minutes, Go over lab/imaging results. Upcoming Health Maintenance Date Due  
 PAP AKA CERVICAL CYTOLOGY 6/26/2017 BREAST CANCER SCRN MAMMOGRAM 1/30/2018 FOBT Q 1 YEAR AGE 50-75 1/30/2018 DTaP/Tdap/Td series (2 - Td) 6/23/2026 Allergies as of 2/13/2018  Review Complete On: 2/13/2018 By: Rancho Mabry MD  
  
 Severity Noted Reaction Type Reactions Hydrocodone-acetaminophen  06/28/2016    Itching Patient denies being allergic to this medication Mustard  06/26/2014    Hives Current Immunizations  Never Reviewed Name Date Rubella Virus Vaccine 5/16/2008 12:00 AM  
  
 Not reviewed this visit You Were Diagnosed With   
  
 Codes Comments Body aches    -  Primary ICD-10-CM: J22 ICD-9-CM: 780.96 Iron deficiency anemia due to chronic blood loss     ICD-10-CM: D50.0 ICD-9-CM: 280.0 Vitamin D deficiency     ICD-10-CM: E55.9 ICD-9-CM: 268.9 Obesity, Class II, BMI 35-39.9, no comorbidity     ICD-10-CM: E66.9 ICD-9-CM: 278.00 Vitals BP Pulse Temp Resp Height(growth percentile) Weight(growth percentile) 148/90 (BP 1 Location: Left arm, BP Patient Position: Sitting) 65 97.9 °F (36.6 °C) (Oral) 12 5' 2\" (1.575 m) 210 lb 12.8 oz (95.6 kg) LMP SpO2 BMI OB Status Smoking Status 02/13/2018 99% 38.56 kg/m2 Having regular periods Never Smoker Vitals History BMI and BSA Data Body Mass Index Body Surface Area 38.56 kg/m 2 2.04 m 2 Preferred Pharmacy Pharmacy Name Phone [facility] [address] [phone] Your Updated Medication List  
  
   
This list is accurate as of: 2/13/18  3:30 PM.  Always use your most recent med list.  
  
  
  
  
 ascorbic acid (vitamin C) 100 mg tab Commonly known as:  VITAMIN C Take 1 Tab by mouth three (3) times daily. carbamide peroxide 6.5 % otic solution Commonly known as:  Guillermo Pickens Administer 5 Drops into each ear two (2) times a day. ergocalciferol 50,000 unit capsule Commonly known as:  ERGOCALCIFEROL Take 1 Cap by mouth every seven (7) days. ibuprofen 800 mg tablet Commonly known as:  MOTRIN Take 1 Tab by mouth every eight (8) hours as needed for Pain for up to 30 days. MUCINEX 1,200 mg Ta12 ER tablet Generic drug:  guaiFENesin  
  
 multivitamin tablet Commonly known as:  ONE A DAY Take 1 Tab by mouth daily. oseltamivir 75 mg capsule Commonly known as:  TAMIFLU  
  
 VIRTUSSIN -10 mg/5 mL solution Generic drug:  guaiFENesin-codeine Take  by mouth every six (6) hours as needed. Prescriptions Sent to Pharmacy Refills  
 ibuprofen (MOTRIN) 800 mg tablet 3 Sig: Take 1 Tab by mouth every eight (8) hours as needed for Pain for up to 30 days. Class: Normal  
 Pharmacy: [facility], [address] [place] [address] Ph #: [phone] Route: Oral  
  
Follow-up Instructions Return in about 4 weeks (around 3/13/2018) for Well woman exam, 30 minutes, Go over lab/imaging results. To-Do List   
 02/13/2018 Lab:  HEMOGLOBIN A1C WITH EAG   
  
 02/13/2018 Lab:  T4, FREE   
  
 02/13/2018 Lab:  TSH 3RD GENERATION   
  
 02/13/2018 Lab:  VITAMIN D, 25 HYDROXY   
  
 02/16/2018 Lab:  CBC WITH AUTOMATED DIFF   
  
 02/16/2018 Lab:  LIPID PANEL   
  
 02/16/2018   Lab:  METABOLIC PANEL, COMPREHENSIVE   
 Introducing \[facility]\"" & HEALTH SERVICES! [facility] introduces [employer] patient portal. Now you can access parts of your medical record, email your doctor's office, and request medication refills online. 1. In your internet browser, go to [email or web address] 2. Click on the First Time User? Click Here link in the Sign In box. You will see the New Member Sign Up page. 3. Enter your [employer] Access Code exactly as it appears below. You will not need to use this code after youve completed the sign-up process. If you do not sign up before the expiration date, you must request a new code. · [employer] Access Code: [email or web address] Expires: 5/14/2018  3:30 PM 
 
4. Enter the last four digits of your Social Security Number (xxxx) and Date of Birth (mm/dd/yyyy) as indicated and click Submit. You will be taken to the next sign-up page. 5. Create a [employer] ID. This will be your [employer] login ID and cannot be changed, so think of one that is secure and easy to remember. 6. Create a [employer] password. You can change your password at any time. 7. Enter your Password Reset Question and Answer. This can be used at a later time if you forget your password. 8. Enter your e-mail address. You will receive e-mail notification when new information is available in [address] 9. Click Sign Up. You can now view and download portions of your medical record. 10. Click the Download Summary menu link to download a portable copy of your medical information. If you have questions, please visit the Frequently Asked Questions section of the [employer] website. Remember, [employer] is NOT to be used for urgent needs. For medical emergencies, dial 911. Now available from your iPhone and Android! Please provide this summary of care documentation to your next provider. Your primary care clinician is listed as Kassandra Guerra. If you have any questions after today's visit, please call [phone].

## 2018-02-13 NOTE — PROGRESS NOTES
Chief Complaint   Patient presents with    Vitamin D Deficiency    Weight Management    Follow-up     Back pain    Elevated Blood Pressure    Follow-up     Urgent Care Cough, Chest congestion       Vitals:    02/13/18 1448 02/13/18 1455   BP: 148/88 148/90  Comment: manual   BP 1 Location: Right arm Left arm   BP Patient Position: Sitting Sitting   Pulse: 65    Resp: 12    Temp: 97.9 °F (36.6 °C)    TempSrc: Oral    SpO2: 99%    Weight: 210 lb 12.8 oz (95.6 kg)    Height: 5' 2\" (1.575 m)      Patient is not fasting. Patient in room # 3.     1. Have you been to the ER, urgent care clinic since your last visit? Hospitalized since your last visit? Yes When: 02/09/2018 Where: [facility] Reason for visit: Cough, Chest congestion    2. Have you seen or consulted any other health care providers outside of the [address] [place] [address] [place] since your last visit? Include any pap smears or colon screening. No     Reviewed. Flu declined.

## 2018-02-13 NOTE — PROGRESS NOTES
Internal Medicine Progress Note    Today's Date:  2018   Patient:  Priya Carranza  Patient :  1970    Subjective:     Chief Complaint   Patient presents with    Vitamin D Deficiency    Weight Management    Elevated Blood Pressure    Follow-up     Urgent Care Cough, Chest congestion      Cough   This is an acute problem. This is not at goal. Pt went to urgent care and was treated for flu. Pt continues to have body aches despite taking tamiflu. Iron deficiency anemia  This is a chronic problem, new to me. This is not controlled. Pt was on iron. Pt reports heavy periods. Obesity Class I  This is a chronic problem. This is not at goal. Pt was going to a weight loss center (Dr. Jhony Rascon). Pt tries to eat a healthy diet. Past Medical History:   Diagnosis Date    Acute bronchitis 2017    Acute right-sided thoracic back pain 2016    Breast pain, right 12/10/2014    Excess ear wax 2014    Fatigue 2014    Fibrocystic breast disease in female 2014    Iron deficiency anemia 12/10/2014    Menorrhagia 2016    Neck pain 2016    Obesity, Class II, BMI 35-39.9, no comorbidity 2014    Pollen allergies 2014    Skin tag 2014    Tinea corporis 2014    Vitamin D deficiency 2014     Past Surgical History:   Procedure Laterality Date    HX BREAST REDUCTION Bilateral 2015    BREAST REDUCTION - bilateral performed by Ariel Mohan MD at [facility] MAIN OR    HX GI      cholecystectomy    HX GYN      C section x2      reports that she has never smoked. She has never used smokeless tobacco. She reports that she drinks alcohol. She reports that she does not use illicit drugs.   Family History   Problem Relation Age of Onset    No Known Problems Mother     No Known Problems Father     No Known Problems Sister     No Known Problems Brother     No Known Problems Maternal Grandmother     Diabetes Paternal Grandmother    [facility] Lung Disease Paternal Grandfather      pneumonia    No Known Problems Brother      Allergies   Allergen Reactions    Hydrocodone-Acetaminophen Itching     Patient denies being allergic to this medication    Mustard Hives     Review of Systems   Positives in bold  CV:      chest pain, palpitations  PULM:  SOB, wheezing, cough, sputum production    Current Outpatient Meds and Allergies     Current Outpatient Prescriptions on File Prior to Visit   Medication Sig Dispense Refill    ascorbic acid (VITAMIN C) 100 mg tab Take 1 Tab by mouth three (3) times daily. 270 Tab 4    ergocalciferol (ERGOCALCIFEROL) 50,000 unit capsule Take 1 Cap by mouth every seven (7) days. 12 Cap 3    multivitamin (ONE A DAY) tablet Take 1 Tab by mouth daily.  carbamide peroxide (DEBROX) 6.5 % otic solution Administer 5 Drops into each ear two (2) times a day. 30 mL 3     No current facility-administered medications on file prior to visit.       Allergies   Allergen Reactions    Hydrocodone-Acetaminophen Itching     Patient denies being allergic to this medication    Mustard Hives     Objective:     VS:    Visit Vitals    /90 (BP 1 Location: Left arm, BP Patient Position: Sitting)  Comment: manual    Pulse 65    Temp 97.9 °F (36.6 °C) (Oral)    Resp 12    Ht 5' 2\" (1.575 m)    Wt 210 lb 12.8 oz (95.6 kg)    LMP 02/13/2018    SpO2 99%    BMI 38.56 kg/m2     General:   Well-nourished, well-groomed, pleasant, alert, in no acute distress  Head:  Normocephalic, atraumatic, MMM, good dentition, oropharynx WNL without membranes, exudates, petechiae or ulcers  Ears:  External ears WNL, TMs could not be visualized, +b/l ear wax  Eyes:  EOMI, PERRL  Nose:  External nares WNL  Cardiovascular:   Regular rate and rhythm, no murmurs, no rubs, no gallops  Pulmonary:   Clear breath sounds bilaterally, good air movement, no wheezing, rales or rhonchi, normal respiratory effort  Psych:  No pressured speech, no abnormal thought content    Assessment/Plan & Orders:         ICD-10-CM ICD-9-CM    1. Body aches R52 780.96 ibuprofen (MOTRIN) 800 mg tablet      CBC WITH AUTOMATED DIFF   2. Iron deficiency anemia due to chronic blood loss D50.0 280.0 LIPID PANEL      METABOLIC PANEL, COMPREHENSIVE      TSH 3RD GENERATION      T4, FREE      HEMOGLOBIN A1C WITH EAG   3. Vitamin D deficiency E55.9 268.9 VITAMIN D, 25 HYDROXY   4. Obesity, Class II, BMI 35-39.9, no comorbidity E66.9 278.00    5. Screening for depression Z13.89 V79.0 [place] DEPRESSION SCREEN ANNUAL      Healthy lifestyle has been encouraged including avoidance of tobacco, limiting or avoiding alcohol intake, heart healthy diet which is low in cholesterol and saturated fat and contains fresh fruits, vegetables and whole grains and fiber, regular exercise with goals of 20-30 minutes 3-5 days weekly and maintaining an optimal BMI. Depression screenin17     Follow-up Disposition:  Return in about 4 weeks (around 3/13/2018) for Well woman exam, 30 minutes, Go over lab/imaging results. *Patient verbalized understanding and agreement with the plan. Patient was given an after-visit summary. Inocencia Conde.  Jonathan Barrientos MD - Internal Medicine  2018, 4:44 PM  [facility]  [address], [address]  Phone [phone]  Fax [phone]

## 2018-02-13 NOTE — PATIENT INSTRUCTIONS
Allergies: Care Instructions  Your Care Instructions    Allergies occur when your body's defense system (immune system) overreacts to certain substances. The immune system treats a harmless substance as if it were a harmful germ or virus. Many things can cause this overreaction, including pollens, medicine, food, dust, animal dander, and mold. Allergies can be mild or severe. Mild allergies can be managed with home treatment. But medicine may be needed to prevent problems. Managing your allergies is an important part of staying healthy. Your doctor may suggest that you have allergy testing to help find out what is causing your allergies. When you know what things trigger your symptoms, you can avoid them. This can prevent allergy symptoms and other health problems. For severe allergies that cause reactions that affect your whole body (anaphylactic reactions), your doctor may prescribe a shot of epinephrine to carry with you in case you have a severe reaction. Learn how to give yourself the shot and keep it with you at all times. Make sure it is not . Follow-up care is a key part of your treatment and safety. Be sure to make and go to all appointments, and call your doctor if you are having problems. It's also a good idea to know your test results and keep a list of the medicines you take. How can you care for yourself at home? · If you have been told by your doctor that dust or dust mites are causing your allergy, decrease the dust around your bed:  [facility] [employer] sheets, pillowcases, and other bedding in hot water every week. ¨ Use dust-proof covers for pillows, duvets, and mattresses. Avoid plastic covers because they tear easily and do not \"breathe. \" Wash as instructed on the label. ¨ Do not use any blankets and pillows that you do not need. ¨ Use blankets that you can wash in your washing machine. ¨ Consider removing drapes and carpets, which attract and hold dust, from your bedroom.   · If you are allergic to house dust and mites, do not use home humidifiers. Your doctor can suggest ways you can control dust and mites. · Look for signs of cockroaches. Cockroaches cause allergic reactions. Use cockroach baits to get rid of them. Then, clean your home well. Cockroaches like areas where grocery bags, newspapers, empty bottles, or cardboard boxes are stored. Do not keep these inside your home, and keep trash and food containers sealed. Seal off any spots where cockroaches might enter your home. · If you are allergic to mold, get rid of furniture, rugs, and drapes that smell musty. Check for mold in the bathroom. · If you are allergic to outdoor pollen or mold spores, use air-conditioning. Change or clean all filters every month. Keep windows closed. · If you are allergic to pollen, stay inside when pollen counts are high. Use a vacuum  with a HEPA filter or a double-thickness filter at least two times each week. · Stay inside when air pollution is bad. Avoid paint fumes, perfumes, and other strong odors. · Avoid conditions that make your allergies worse. Stay away from smoke. Do not smoke or let anyone else smoke in your house. Do not use fireplaces or wood-burning stoves. · If you are allergic to your pets, change the air filter in your furnace every month. Use high-efficiency filters. · If you are allergic to pet dander, keep pets outside or out of your bedroom. Old carpet and cloth furniture can hold a lot of animal dander. You may need to replace them. When should you call for help? Give an epinephrine shot if:  ? · You think you are having a severe allergic reaction. ? · You have symptoms in more than one body area, such as mild nausea and an itchy mouth. ? After giving an epinephrine shot call 911, even if you feel better. ?Call 911 if:  ? · You have symptoms of a severe allergic reaction. These may include:  ¨ Sudden raised, red areas (hives) all over your body.   ¨ Swelling of the throat, mouth, lips, or tongue. ¨ Trouble breathing. ¨ Passing out (losing consciousness). Or you may feel very lightheaded or suddenly feel weak, confused, or restless. ? · You have been given an epinephrine shot, even if you feel better. ?Call your doctor now or seek immediate medical care if:  ? · You have symptoms of an allergic reaction, such as:  ¨ A rash or hives (raised, red areas on the skin). ¨ Itching. ¨ Swelling. ¨ Belly pain, nausea, or vomiting. ? Watch closely for changes in your health, and be sure to contact your doctor if:  ? · You do not get better as expected. Where can you learn more? Go to http://donavan-ty.info/. Enter E475 in the search box to learn more about \"Allergies: Care Instructions. \"  Current as of: September 29, 2016  Content Version: 11.4  © 9559-3981 [employer]. Care instructions adapted under license by [employer] (which disclaims liability or warranty for this information). If you have questions about a medical condition or this instruction, always ask your healthcare professional. [place] [address] any warranty or liability for your use of this information.

## 2018-08-30 ENCOUNTER — TELEPHONE (OUTPATIENT)
Dept: FAMILY MEDICINE CLINIC | Facility: CLINIC | Age: 48
End: 2018-08-30

## 2018-08-30 NOTE — TELEPHONE ENCOUNTER
Left vm for pt to call office to schedule 30 min well woman exam that was due 3/13/18 and to find out if she's had a yearly mammogram.

## 2019-02-28 DIAGNOSIS — R52 BODY ACHES: ICD-10-CM

## 2019-02-28 RX ORDER — IBUPROFEN 800 MG/1
TABLET ORAL
Qty: 90 TAB | Refills: 0 | OUTPATIENT
Start: 2019-02-28

## 2019-02-28 NOTE — LETTER
3/1/2019 4:28 PM 
 
Ms. Nerissa Noriega [address] [id number] Dear Ms. Michelle Bettina missed you! Please call our office at [phone] and schedule a follow up appointment for your continued care. Fasting labs needed. Please come to next appt fasting 8-12hrs before visit. Sincerely, Natalie Carroll MD

## 2019-03-11 ENCOUNTER — OFFICE VISIT (OUTPATIENT)
Dept: FAMILY MEDICINE CLINIC | Age: 49
End: 2019-03-11

## 2019-03-11 VITALS
WEIGHT: 197 LBS | TEMPERATURE: 97.4 F | HEIGHT: 62 IN | SYSTOLIC BLOOD PRESSURE: 123 MMHG | DIASTOLIC BLOOD PRESSURE: 75 MMHG | HEART RATE: 86 BPM | BODY MASS INDEX: 36.25 KG/M2 | RESPIRATION RATE: 14 BRPM

## 2019-03-11 DIAGNOSIS — J06.9 UPPER RESPIRATORY TRACT INFECTION, UNSPECIFIED TYPE: ICD-10-CM

## 2019-03-11 DIAGNOSIS — R68.89 FLU-LIKE SYMPTOMS: Primary | ICD-10-CM

## 2019-03-11 LAB
QUICKVUE INFLUENZA TEST: NEGATIVE
VALID INTERNAL CONTROL?: YES

## 2019-03-11 RX ORDER — ALBUTEROL SULFATE 90 UG/1
2 AEROSOL, METERED RESPIRATORY (INHALATION)
Qty: 1 INHALER | Refills: 0 | Status: SHIPPED | OUTPATIENT
Start: 2019-03-11 | End: 2019-07-30

## 2019-03-11 RX ORDER — BROMPHENIRAMINE MALEATE, PSEUDOEPHEDRINE HYDROCHLORIDE, AND DEXTROMETHORPHAN HYDROBROMIDE 2; 30; 10 MG/5ML; MG/5ML; MG/5ML
5 SYRUP ORAL
Qty: 118 ML | Refills: 0 | Status: SHIPPED | OUTPATIENT
Start: 2019-03-11 | End: 2019-03-11 | Stop reason: SDUPTHER

## 2019-03-11 RX ORDER — BROMPHENIRAMINE MALEATE, PSEUDOEPHEDRINE HYDROCHLORIDE, AND DEXTROMETHORPHAN HYDROBROMIDE 2; 30; 10 MG/5ML; MG/5ML; MG/5ML
5 SYRUP ORAL
Qty: 118 ML | Refills: 0 | Status: SHIPPED | OUTPATIENT
Start: 2019-03-11 | End: 2019-07-30

## 2019-03-11 RX ORDER — IBUPROFEN 800 MG/1
800 TABLET ORAL
Qty: 180 TAB | Refills: 1 | Status: SHIPPED | OUTPATIENT
Start: 2019-03-11 | End: 2019-07-31 | Stop reason: SDUPTHER

## 2019-03-11 RX ORDER — ALBUTEROL SULFATE 90 UG/1
2 AEROSOL, METERED RESPIRATORY (INHALATION)
Qty: 1 INHALER | Refills: 0 | Status: SHIPPED | OUTPATIENT
Start: 2019-03-11 | End: 2019-03-11 | Stop reason: SDUPTHER

## 2019-03-11 NOTE — PATIENT INSTRUCTIONS
Upper Respiratory Infection (Cold): Care Instructions  Your Care Instructions    An upper respiratory infection, or URI, is an infection of the nose, sinuses, or throat. URIs are spread by coughs, sneezes, and direct contact. The common cold is the most frequent kind of URI. The flu and sinus infections are other kinds of URIs. Almost all URIs are caused by viruses. Antibiotics won't cure them. But you can treat most infections with home care. This may include drinking lots of fluids and taking over-the-counter pain medicine. You will probably feel better in 4 to 10 days. The doctor has checked you carefully, but problems can develop later. If you notice any problems or new symptoms, get medical treatment right away. Follow-up care is a key part of your treatment and safety. Be sure to make and go to all appointments, and call your doctor if you are having problems. It's also a good idea to know your test results and keep a list of the medicines you take. How can you care for yourself at home? · To prevent dehydration, drink plenty of fluids, enough so that your urine is light yellow or clear like water. Choose water and other caffeine-free clear liquids until you feel better. If you have kidney, heart, or liver disease and have to limit fluids, talk with your doctor before you increase the amount of fluids you drink. · Take an over-the-counter pain medicine, such as acetaminophen (Tylenol), ibuprofen (Advil, Motrin), or naproxen (Aleve). Read and follow all instructions on the label. · Before you use cough and cold medicines, check the label. These medicines may not be safe for young children or for people with certain health problems. · Be careful when taking over-the-counter cold or flu medicines and Tylenol at the same time. Many of these medicines have acetaminophen, which is Tylenol. Read the labels to make sure that you are not taking more than the recommended dose.  Too much acetaminophen (Tylenol) can be harmful. · Get plenty of rest.  · Do not smoke or allow others to smoke around you. If you need help quitting, talk to your doctor about stop-smoking programs and medicines. These can increase your chances of quitting for good. When should you call for help? Call 911 anytime you think you may need emergency care. For example, call if:    · You have severe trouble breathing.    Call your doctor now or seek immediate medical care if:    · You seem to be getting much sicker.     · You have new or worse trouble breathing.     · You have a new or higher fever.     · You have a new rash.    Watch closely for changes in your health, and be sure to contact your doctor if:    · You have a new symptom, such as a sore throat, an earache, or sinus pain.     · You cough more deeply or more often, especially if you notice more mucus or a change in the color of your mucus.     · You do not get better as expected. Where can you learn more? Go to http://donavan-ty.info/. Enter V338 in the search box to learn more about \"Upper Respiratory Infection (Cold): Care Instructions. \"  Current as of: September 5, 2018  Content Version: 11.9  © 3008-5595 [employer]. Care instructions adapted under license by [employer] (which disclaims liability or warranty for this information). If you have questions about a medical condition or this instruction, always ask your healthcare professional. [place] [address] any warranty or liability for your use of this information. Saline Nasal Washes: Care Instructions  Your Care Instructions  Saline nasal washes help keep the nasal passages open by washing out thick or dried mucus. This simple remedy can help relieve symptoms of allergies, sinusitis, and colds.  It also can make the nose feel more comfortable by keeping the mucous membranes moist. You may notice a little burning sensation in your nose the first few times you use the solution, but this usually gets better in a few days. Follow-up care is a key part of your treatment and safety. Be sure to make and go to all appointments, and call your doctor if you are having problems. It's also a good idea to know your test results and keep a list of the medicines you take. How can you care for yourself at home? · You can buy premixed saline solution in a squeeze bottle or other sinus rinse products at a drugstore. Read and follow the instructions on the label. · You also can make your own saline solution by adding 1 teaspoon of salt and 1 teaspoon of baking soda to 2 cups of distilled water. · If you use a homemade solution, pour a small amount into a clean bowl. Using a rubber bulb syringe, squeeze the syringe and place the tip in the salt water. Pull a small amount of the salt water into the syringe by relaxing your hand. · Sit down with your head tilted slightly back. Do not lie down. Put the tip of the bulb syringe or the squeeze bottle a little way into one of your nostrils. Gently drip or squirt a few drops into the nostril. Repeat with the other nostril. Some sneezing and gagging are normal at first.  · Gently blow your nose. · Wipe the syringe or bottle tip clean after each use. · Repeat this 2 or 3 times a day. · Use nasal washes gently if you have nosebleeds often. When should you call for help? Watch closely for changes in your health, and be sure to contact your doctor if:    · You often get nosebleeds.     · You have problems doing the nasal washes. Where can you learn more? Go to http://donavan-ty.info/. Enter 070 [id number] [id number] in the search box to learn more about \"Saline Nasal Washes: Care Instructions. \"  Current as of: March 27, 2018  Content Version: 11.9  © 7961-4080 [employer]. Care instructions adapted under license by [employer] (which disclaims liability or warranty for this information).  If you have questions about a medical condition or this instruction, always ask your healthcare professional. [place] [address] any warranty or liability for your use of this information.

## 2019-03-11 NOTE — PROGRESS NOTES
[facility]    CC: Cold/Flu Symptoms    HPI:     - Timing/onset: Friday (3/8/2019)  - Progression/Course: Worsening  - Associated Symptoms/signs: fever, chills, SOB, body aches, cough, sputum (yellowish mucus), rhinorrhea, and stuffy nose.    - Tried: Theraflu day and night, Advil, lauri seltzer cold and flu, airborne  - Exposures: sick contacts at  (children)      ROS: Positive items marked in RED  CON: fever, chills  Cardiovascular: palpitations, CP  Resp: SOB, cough  GI: nausea, vomiting, diarrhea  : dysuria, hematuria      Past Medical History:   Diagnosis Date    Acute bronchitis 4/11/2017    Acute right-sided thoracic back pain 6/23/2016    Breast pain, right 12/10/2014    Excess ear wax 6/26/2014    Fatigue 5/28/2014    Fibrocystic breast disease in female 6/26/2014    Iron deficiency anemia 12/10/2014    Menorrhagia 2/25/2016    Neck pain 6/23/2016    Obesity, Class II, BMI 35-39.9, no comorbidity 5/28/2014    Pollen allergies 5/28/2014    Skin tag 6/26/2014    Tinea corporis 6/26/2014    Vitamin D deficiency 6/26/2014       Past Surgical History:   Procedure Laterality Date    HX BREAST REDUCTION Bilateral 2/26/2015    BREAST REDUCTION - bilateral performed by Holly Stewart MD at [address] [place] [address] [address] HX GI      cholecystectomy    HX GYN      C section x2       Family History   Problem Relation Age of Onset    No Known Problems Mother     No Known Problems Father     No Known Problems Sister     No Known Problems Brother     No Known Problems Maternal Grandmother     Diabetes Paternal Grandmother     Lung Disease Paternal Grandfather         pneumonia    No Known Problems Brother        Social History     Socioeconomic History    Marital status:      Spouse name: Not on file    Number of children: Not on file    Years of education: Not on file    Highest education level: Not on file   Tobacco Use    Smoking status: Never Smoker    Smokeless tobacco: Never Used   Substance and Sexual Activity    Alcohol use: Yes     Comment: socially    Drug use: No    Sexual activity: Not Currently     Partners: Male     Birth control/protection: None   Other Topics Concern     Service No    Blood Transfusions No    Caffeine Concern No    Occupational Exposure No    Hobby Hazards No    Sleep Concern No    Stress Concern No    Weight Concern Yes    Special Diet No    Back Care No    Exercise No    Bike Helmet Yes    Seat Belt Yes    Self-Exams Yes       Allergies   Allergen Reactions    Hydrocodone-Acetaminophen Itching     Patient denies being allergic to this medication    Mustard Hives         Current Outpatient Medications:     ascorbic acid (VITAMIN C) 100 mg tab, Take 1 Tab by mouth three (3) times daily. , Disp: 270 Tab, Rfl: 4    ergocalciferol (ERGOCALCIFEROL) 50,000 unit capsule, Take 1 Cap by mouth every seven (7) days. , Disp: 12 Cap, Rfl: 3    multivitamin (ONE A DAY) tablet, Take 1 Tab by mouth daily. , Disp: , Rfl:     MUCINEX 1,200 mg Ta12 ER tablet, , Disp: , Rfl: 0    oseltamivir (TAMIFLU) 75 mg capsule, , Disp: , Rfl: 0    VIRTUSSIN AC  mg/5 mL solution, Take  by mouth every six (6) hours as needed. , Disp: , Rfl: 0    carbamide peroxide (DEBROX) 6.5 % otic solution, Administer 5 Drops into each ear two (2) times a day., Disp: 30 mL, Rfl: 3    Physical Exam:      /75   Pulse 86   Temp 97.4 °F (36.3 °C) (Oral)   Resp 14   Ht 5' 2\" (1.575 m)   Wt 197 lb (89.4 kg)   BMI 36.03 kg/m²     General:  Obese habitus, NAD, conversant  Eyes: sclera clear bilaterally, no discharge noted, eyelids normal in appearance  HENT: NCAT, nasal turbinates enlarged bilaterally, oropharynx clear, MMM  Lungs: Slight/faint end expiratory wheeze, normal respiratory effort and rate  CV: RRR, no MRGs  ABD: soft, non-tender, non-distended, normal bowel sounds  Skin: normal temperature, turgor, color, and texture  Psych: alert and oriented to person, place and situation, normal affect  Neuro: speech normal, moving all extremities, gait normal    Results for Nikhil Wynne (MRN [id number]):   Ref.  Range 3/11/2019 14:45   [facility] Influenza test Latest Ref Range: Negative  Negative       Assessment/Plan     URI:  -Likely viral etiology  -Counseled on recommendations for symptomatic treatment  -Will start on Dimetapp and Albuterol regimen  -Handout given on URI care and nasal saline washes  -Follow up as needed if symptoms worsen or fail to improve        Ruben Villegas MD  3/11/2019, 2:44 PM

## 2019-03-11 NOTE — PROGRESS NOTES
Chief Complaint   Patient presents with    Cold Symptoms     1. Have you been to the ER, urgent care clinic since your last visit? Hospitalized since your last visit? No.    2. Have you seen or consulted any other health care providers outside of the [address] [place] [address] since your last visit? Include any pap smears or colon screening.  No.    Visit Vitals  /75   Pulse 86   Temp 97.4 °F (36.3 °C) (Oral)   Resp 14   Ht 5' 2\" (1.575 m)   Wt 197 lb (89.4 kg)   BMI 36.03 kg/m²

## 2019-03-18 ENCOUNTER — PATIENT OUTREACH (OUTPATIENT)
Dept: FAMILY MEDICINE CLINIC | Facility: CLINIC | Age: 49
End: 2019-03-18

## 2019-03-18 NOTE — PROGRESS NOTES
NN health screening:    Provided via  the CS# to schedule her mammogram and encouraged her to call [facility] office to schedule well woman but to also call me to navigate her through her [address] screening.

## 2019-05-29 ENCOUNTER — PATIENT OUTREACH (OUTPATIENT)
Dept: FAMILY MEDICINE CLINIC | Facility: CLINIC | Age: 49
End: 2019-05-29

## 2019-05-29 NOTE — PROGRESS NOTES
NN health screenings:    Ms Capri Joyce has not returned my calls regarding screening f/u nor has she heeded encouragement to schedule f/u OV's. Closed this episode of care.

## 2019-07-30 ENCOUNTER — OFFICE VISIT (OUTPATIENT)
Dept: FAMILY MEDICINE CLINIC | Facility: CLINIC | Age: 49
End: 2019-07-30

## 2019-07-30 ENCOUNTER — HOSPITAL ENCOUNTER (OUTPATIENT)
Dept: LAB | Age: 49
Discharge: HOME | End: 2019-07-30
Payer: COMMERCIAL

## 2019-07-30 VITALS
RESPIRATION RATE: 15 BRPM | WEIGHT: 205.2 LBS | HEIGHT: 62 IN | OXYGEN SATURATION: 98 % | TEMPERATURE: 97.1 F | HEART RATE: 70 BPM | DIASTOLIC BLOOD PRESSURE: 80 MMHG | SYSTOLIC BLOOD PRESSURE: 128 MMHG | BODY MASS INDEX: 37.76 KG/M2

## 2019-07-30 DIAGNOSIS — M25.561 CHRONIC PAIN OF RIGHT KNEE: ICD-10-CM

## 2019-07-30 DIAGNOSIS — M25.551 RIGHT HIP PAIN: ICD-10-CM

## 2019-07-30 DIAGNOSIS — Z13.31 SCREENING FOR DEPRESSION: ICD-10-CM

## 2019-07-30 DIAGNOSIS — Z11.3 SCREEN FOR STD (SEXUALLY TRANSMITTED DISEASE): ICD-10-CM

## 2019-07-30 DIAGNOSIS — Z00.00 ROUTINE GENERAL MEDICAL EXAMINATION AT A HEALTH CARE FACILITY: Primary | ICD-10-CM

## 2019-07-30 DIAGNOSIS — G89.29 CHRONIC PAIN OF RIGHT KNEE: ICD-10-CM

## 2019-07-30 DIAGNOSIS — E66.01 SEVERE OBESITY (HCC): ICD-10-CM

## 2019-07-30 DIAGNOSIS — E55.9 VITAMIN D DEFICIENCY: ICD-10-CM

## 2019-07-30 LAB
ALBUMIN SERPL-MCNC: 4 G/DL (ref 3.4–5)
ALBUMIN/GLOB SERPL: 1 {RATIO} (ref 0.8–1.7)
ALP SERPL-CCNC: 102 U/L (ref 45–117)
ALT SERPL-CCNC: 24 U/L (ref 13–56)
ANION GAP SERPL CALC-SCNC: 3 MMOL/L (ref 3–18)
AST SERPL-CCNC: 17 U/L (ref 10–38)
BILIRUB SERPL-MCNC: 0.5 MG/DL (ref 0.2–1)
BILIRUB UR QL STRIP: NEGATIVE
BUN SERPL-MCNC: 19 MG/DL (ref 7–18)
BUN/CREAT SERPL: 23 (ref 12–20)
CALCIUM SERPL-MCNC: 9.1 MG/DL (ref 8.5–10.1)
CHLORIDE SERPL-SCNC: 102 MMOL/L (ref 100–111)
CHOLEST SERPL-MCNC: 243 MG/DL
CO2 SERPL-SCNC: 31 MMOL/L (ref 21–32)
CREAT SERPL-MCNC: 0.84 MG/DL (ref 0.6–1.3)
EST. AVERAGE GLUCOSE BLD GHB EST-MCNC: 105 MG/DL
GLOBULIN SER CALC-MCNC: 3.9 G/DL (ref 2–4)
GLUCOSE SERPL-MCNC: 81 MG/DL (ref 74–99)
GLUCOSE UR-MCNC: NEGATIVE MG/DL
HBA1C MFR BLD: 5.3 % (ref 4.2–5.6)
HDLC SERPL-MCNC: 106 MG/DL (ref 40–60)
HDLC SERPL: 2.3 {RATIO} (ref 0–5)
KETONES P FAST UR STRIP-MCNC: NEGATIVE MG/DL
LDLC SERPL CALC-MCNC: 123.8 MG/DL (ref 0–100)
LIPID PROFILE,FLP: ABNORMAL
PH UR STRIP: 7 [PH] (ref 4.6–8)
POTASSIUM SERPL-SCNC: 4.2 MMOL/L (ref 3.5–5.5)
PROT SERPL-MCNC: 7.9 G/DL (ref 6.4–8.2)
PROT UR QL STRIP: NEGATIVE
SODIUM SERPL-SCNC: 136 MMOL/L (ref 136–145)
SP GR UR STRIP: 1.02 (ref 1–1.03)
TRIGL SERPL-MCNC: 66 MG/DL (ref ?–150)
UA UROBILINOGEN AMB POC: NORMAL (ref 0.2–1)
URINALYSIS CLARITY POC: CLEAR
URINALYSIS COLOR POC: YELLOW
URINE BLOOD POC: NEGATIVE
URINE LEUKOCYTES POC: NEGATIVE
URINE NITRITES POC: NEGATIVE
VLDLC SERPL CALC-MCNC: 13.2 MG/DL

## 2019-07-30 PROCEDURE — 87340 HEPATITIS B SURFACE AG IA: CPT

## 2019-07-30 PROCEDURE — 87389 HIV-1 AG W/HIV-1&-2 AB AG IA: CPT

## 2019-07-30 PROCEDURE — 80053 COMPREHEN METABOLIC PANEL: CPT

## 2019-07-30 PROCEDURE — 86706 HEP B SURFACE ANTIBODY: CPT

## 2019-07-30 PROCEDURE — 86695 HERPES SIMPLEX TYPE 1 TEST: CPT

## 2019-07-30 PROCEDURE — 83036 HEMOGLOBIN GLYCOSYLATED A1C: CPT

## 2019-07-30 PROCEDURE — 82306 VITAMIN D 25 HYDROXY: CPT

## 2019-07-30 PROCEDURE — 80061 LIPID PANEL: CPT

## 2019-07-30 PROCEDURE — 86704 HEP B CORE ANTIBODY TOTAL: CPT

## 2019-07-30 PROCEDURE — 86803 HEPATITIS C AB TEST: CPT

## 2019-07-30 RX ORDER — IBUPROFEN 800 MG/1
800 TABLET ORAL
Qty: 180 TAB | Refills: 1 | Status: CANCELLED | OUTPATIENT
Start: 2019-07-30

## 2019-07-30 NOTE — PROGRESS NOTES
Colleen Jacobs is a 44 y.o.  female presents today for office visit for follow up. Pt would also like to discuss pelvic pain. Pt is  fasting. Pt is in Room # 2      1. Have you been to the ER, urgent care clinic since your last visit? Hospitalized since your last visit? No    2. Have you seen or consulted any other health care providers outside of the [address] [place] [address] since your last visit? Include any pap smears or colon screening. No    Upcoming Appts  none    Health Maintenance reviewed       VORB: No orders of the defined types were placed in this encounter.   Arnaud Briones, KAPILN

## 2019-07-30 NOTE — PROGRESS NOTES
History and Physical    Today's Date:  2019   Patient's Name: Bong Franco   Patient's :  1970     History:     Chief Complaint   Patient presents with    Hip Pain     right started about a mth ago     Knee Pain     right (popping) started about a mth ago     Annual Exam     Right hip pain   This is a new problem. This is not at goal. This has been present for one month. Pt would like to get a pelvic ultrasound. She believes this may be due to a cyst in her ovary. Right knee pain   This is a new problem. This is not at goal. This has been present for one month. She notes a popping in her right knee. 3 most recent PHQ Screens 2019   PHQ Not Done -   Little interest or pleasure in doing things Not at all   Feeling down, depressed, irritable, or hopeless Not at all   Total Score PHQ 2 0     Past Medical History:   Diagnosis Date    Acute bronchitis 2017    Acute right-sided thoracic back pain 2016    Breast pain, right 12/10/2014    Excess ear wax 2014    Fatigue 2014    Fibrocystic breast disease in female 2014    Iron deficiency anemia 12/10/2014    Menorrhagia 2016    Neck pain 2016    Obesity, Class II, BMI 35-39.9, no comorbidity 2014    Pollen allergies 2014    Skin tag 2014    Tinea corporis 2014    Vitamin D deficiency 2014     Past Surgical History:   Procedure Laterality Date    HX BREAST REDUCTION Bilateral 2015    BREAST REDUCTION - bilateral performed by Ailin Rehman MD at [address] [place] [address] HX GI      cholecystectomy    HX GYN      C section x2      reports that she has never smoked. She has never used smokeless tobacco. She reports that she drinks alcohol. She reports that she does not use drugs.   Family History   Problem Relation Age of Onset    No Known Problems Mother     No Known Problems Father     No Known Problems Sister     No Known Problems Brother     No Known Problems Maternal Grandmother     Diabetes Paternal Grandmother     Lung Disease Paternal Grandfather         pneumonia    No Known Problems Brother      Allergies   Allergen Reactions    Hydrocodone-Acetaminophen Itching     Patient denies being allergic to this medication    Mustard Hives     Problem List:      Patient Active Problem List   Diagnosis Code    Obesity, Class II, BMI 35-39.9, no comorbidity E66.9    Pollen allergies J30.1    Vitamin D deficiency E55.9    Skin tag L91.8    Excess ear wax H61.20    Fibrocystic breast disease in female N62.18    Iron deficiency anemia D50.9    Menorrhagia N92.0    Severe obesity ([address] E66.01     Medications:     Current Outpatient Medications   Medication Sig    ibuprofen (MOTRIN) 800 mg tablet Take 1 Tab by mouth every six (6) hours as needed for Pain.  multivitamin (ONE A DAY) tablet Take 1 Tab by mouth daily. No current facility-administered medications for this visit.       Review of Systems:   General:   fevers, chills  Neurologic: dizziness, lightheadedness  Eyes:  vision changes, double vision, photophobia  Ears:  change in hearing, ear pain, ear discharge, ear ringing  Nose:  sneezing, runny nose  Mouth/Throat: sore throat, voice change  Neck:  pain, stiffness  Respiratory: dyspnea at rest, dyspnea on exertion  Cardiovascular:   chest pain, palpitations  Breasts: lumps, discharge, pain, rash, skin changes, changes on self-exam  Gastrointestinal:  nausea, vomiting  Urinary: dysuria, urinary frequency, nocturia, malodorous urine  Genital (F): vaginal discharge, ulcerations  Musculoskeletal:  +joint pain (right hip, right knee pops), back pain  Psychiatric: insomnia, anxiety  Endocrine: cold intolerance, heat intolerance  Hematologic: easy bruising, easy bleeding  Dermatologic: Itching, rash    Physical Assessment:     Visit Vitals  /80 (BP 1 Location: Left arm, BP Patient Position: Sitting)   Pulse 70   Temp 97.1 °F (36.2 °C) (Oral)   Resp 15   Ht 5' 2\" (1.575 m)   Wt 205 lb 3.2 oz (93.1 kg)   LMP 06/04/2019   SpO2 98%   BMI 37.53 kg/m²     General:   Well-groomed, well-nourished, in no distress, pleasant, alert, appropriate and conversant. Eyes:    EOMI, conjunctiva clear  Ears:  TMs normal, no ear wax  Mouth:   oropharynx WNL without membranes, exudates, petechiae or ulcers  Neck:    Neck supple, no swelling, mass or tenderness  Cardiovascular:   RRR, no MRG. Pulmonary:   Lungs clear bilaterally. Normal respiratory effort. Abdomen:   Abdomen soft, NT, ND  Extremities:   No edema, LEs warm and well-perfused. Neuro:   No focal deficits. No facial asymmetry noted. Skin:    No rash or jaundice  MSK:   Normal ROM, 5/5 muscle strength, +lateral hip TTP, +true groin TTP, normal knee exam  Psych:  No pressured speech or abnormal thought content    Lab Results   Component Value Date/Time    Glucose 81 05/28/2014 09:56 AM    LDL, calculated 90 05/28/2014 09:56 AM    Creatinine 0.82 05/28/2014 09:56 AM      Assessment/Plan & Orders:         ICD-10-CM ICD-9-CM    1. Routine general medical examination at a health care facility Z00.00 V70.0    2. Right hip pain M25.551 719.45 US PELV NON OBS  LTD      AMB POC URINALYSIS DIP STICK AUTO W/O MICRO   3. Chronic pain of right knee M25.561 719.46     G89.29 338.29    4. Severe obesity (HCC) E66.01 278.01 LIPID PANEL      METABOLIC PANEL, COMPREHENSIVE      HEMOGLOBIN A1C WITH EAG   5. Vitamin D deficiency E55.9 268.9 VITAMIN D, 25 HYDROXY   6. Screen for STD (sexually transmitted disease) Z11.3 V74.5    7.  Screening for depression Z13.31 V79.0 VT DEPRESSION SCREEN ANNUAL      HSV 1 AND 2 ABS, IGM      HSV-1 AB, IGG GLYCOPROTEIN, G-SPECIFIC      HEPATITIS B CORE AB, TOTAL      HEP B SURFACE AG      HEP B SURFACE AB      CT/NG/T.VAGINALIS AMPLIFICATION      HEPATITIS C AB      HIV 1/2 AG/AB, 4TH GENERATION,W RFLX CONFIRM     HM  Colon cancer: colonoscopy due but pt declines  Dyslipidemia: will check FLP  Diabetes mellitus: will check A1c  Influenza vaccine: pt declines  Pneumococcal vaccine: due at age 72  Tdap: 2017  Herpes Zoster vaccine: due at age 61  Hep B vaccine: not indicated (liver dz, DM 19-59)  Weight:  Body mass index is 37.53 kg/m². Discussed the patient's BMI with her. The BMI follow up plan is as follows: Improve diet and 30 min of moderate activity at least 5 times a week  Cervical cancer:  pap smear due  Breast Cancer: mammogram due, pt will schedule  Osteoporosis: No indication for DEXA scan    Pt states that her  travels for work. She would like STD screening    Follow-up and Dispositions    · Return in about 1 month (around 8/27/2019) for Go over lab/imaging results, Well woman exam, Pain. *Patient verbalized understanding and agreement with the plan. Patient was given an after-visit summary. French Rose.  Chepe Varner MD - Internal Medicine  7/30/2019, 3:09 PM  [facility]  [address] [address], [address]  Phone [phone]  Fax [phone]

## 2019-07-30 NOTE — PATIENT INSTRUCTIONS
Hip Pain: Care Instructions  Your Care Instructions    Hip pain may be caused by many things, including overuse, a fall, or a twisting movement. Another cause of hip pain is arthritis. Your pain may increase when you stand up, walk, or squat. The pain may come and go or may be constant. Home treatment can help relieve hip pain, swelling, and stiffness. If your pain is ongoing, you may need more tests and treatment. Follow-up care is a key part of your treatment and safety. Be sure to make and go to all appointments, and call your doctor if you are having problems. It's also a good idea to know your test results and keep a list of the medicines you take. How can you care for yourself at home? · Take pain medicines exactly as directed. ? If the doctor gave you a prescription medicine for pain, take it as prescribed. ? If you are not taking a prescription pain medicine, ask your doctor if you can take an over-the-counter medicine. · Rest and protect your hip. Take a break from any activity, including standing or walking, that may cause pain. · Put ice or a cold pack against your hip for 10 to 20 minutes at a time. Try to do this every 1 to 2 hours for the next 3 days (when you are awake) or until the swelling goes down. Put a thin cloth between the ice and your skin. · Sleep on your healthy side with a pillow between your knees, or sleep on your back with pillows under your knees. · If there is no swelling, you can put moist heat, a heating pad, or a warm cloth on your hip. Do gentle stretching exercises to help keep your hip flexible. · Learn how to prevent falls. Have your vision and hearing checked regularly. Wear slippers or shoes with a nonskid sole. · Stay at a healthy weight. · Wear comfortable shoes. When should you call for help? Call 911 anytime you think you may need emergency care.  For example, call if:    · You have sudden chest pain and shortness of breath, or you cough up blood.     · You are not able to stand or walk or bear weight.     · Your buttocks, legs, or feet feel numb or tingly.     · Your leg or foot is cool or pale or changes color.     · You have severe pain.    Call your doctor now or seek immediate medical care if:    · You have signs of infection, such as:  ? Increased pain, swelling, warmth, or redness in the hip area. ? Red streaks leading from the hip area. ? Pus draining from the hip area. ? A fever.     · You have signs of a blood clot, such as:  ? Pain in your calf, back of the knee, thigh, or groin. ? Redness and swelling in your leg or groin.     · You are not able to bend, straighten, or move your leg normally.     · You have trouble urinating or having bowel movements.    Watch closely for changes in your health, and be sure to contact your doctor if:    · You do not get better as expected. Where can you learn more? Go to http://donavan-ty.info/. Enter K870 in the search box to learn more about \"Hip Pain: Care Instructions. \"  Current as of: September 23, 2018  Content Version: 12.1  © 4427-8979 [employer]wise, [employer]orporated. Care instructions adapted under license by [employer] (which disclaims liability or warranty for this information). If you have questions about a medical condition or this instruction, always ask your healthcare professional. [place] [address] any warranty or liability for your use of this information.

## 2019-07-31 PROBLEM — E78.00 PURE HYPERCHOLESTEROLEMIA: Status: ACTIVE | Noted: 2019-07-31

## 2019-07-31 LAB
25(OH)D3 SERPL-MCNC: 21.7 NG/ML (ref 30–100)
HBV CORE AB SERPL QL IA: NEGATIVE
HBV SURFACE AB SER QL IA: NEGATIVE
HBV SURFACE AB SERPL IA-ACNC: <3.1 MIU/ML
HBV SURFACE AG SER QL: 0.28 INDEX
HBV SURFACE AG SER QL: NEGATIVE
HCV AB SER IA-ACNC: 0.02 INDEX
HCV AB SERPL QL IA: NEGATIVE
HCV COMMENT,HCGAC: NORMAL
HEP BS AB COMMENT,HBSAC: ABNORMAL
HIV 1+2 AB+HIV1 P24 AG SERPL QL IA: NONREACTIVE
HIV12 RESULT COMMENT, HHIVC: NORMAL
HSV1 IGG SER IA-ACNC: 1.3 INDEX (ref 0–0.9)
HSV1 IGM TITR SER IF: NORMAL TITER
HSV2 IGM TITR SER IF: NORMAL TITER

## 2019-07-31 RX ORDER — ERGOCALCIFEROL 1.25 MG/1
50000 CAPSULE ORAL
Qty: 12 CAP | Refills: 0 | Status: SHIPPED | OUTPATIENT
Start: 2019-07-31 | End: 2022-03-11 | Stop reason: ALTCHOICE

## 2019-07-31 RX ORDER — IBUPROFEN 800 MG/1
800 TABLET ORAL
Qty: 90 TAB | Refills: 0 | Status: SHIPPED | OUTPATIENT
Start: 2019-07-31 | End: 2019-09-08 | Stop reason: SDUPTHER

## 2019-07-31 NOTE — PROGRESS NOTES
Result reviewed. LPN to call pt with results. STD screen is negative. Pt has low vitamin D. Ibuprofen and once a week vitamin D sent to the pharmacy.

## 2019-09-12 ENCOUNTER — HOSPITAL ENCOUNTER (OUTPATIENT)
Dept: LAB | Age: 49
Discharge: HOME | End: 2019-09-12
Payer: COMMERCIAL

## 2019-09-12 ENCOUNTER — OFFICE VISIT (OUTPATIENT)
Dept: FAMILY MEDICINE CLINIC | Facility: CLINIC | Age: 49
End: 2019-09-12

## 2019-09-12 VITALS
TEMPERATURE: 97.4 F | HEART RATE: 72 BPM | SYSTOLIC BLOOD PRESSURE: 120 MMHG | BODY MASS INDEX: 38.64 KG/M2 | WEIGHT: 210 LBS | HEIGHT: 62 IN | DIASTOLIC BLOOD PRESSURE: 70 MMHG | OXYGEN SATURATION: 97 % | RESPIRATION RATE: 16 BRPM

## 2019-09-12 DIAGNOSIS — Z12.39 SCREENING FOR BREAST CANCER: ICD-10-CM

## 2019-09-12 DIAGNOSIS — Z12.11 SCREEN FOR COLON CANCER: ICD-10-CM

## 2019-09-12 DIAGNOSIS — M79.672 PAIN IN BOTH FEET: ICD-10-CM

## 2019-09-12 DIAGNOSIS — N89.8 VAGINAL DISCHARGE: ICD-10-CM

## 2019-09-12 DIAGNOSIS — R10.31 RIGHT GROIN PAIN: ICD-10-CM

## 2019-09-12 DIAGNOSIS — Z23 ENCOUNTER FOR IMMUNIZATION: ICD-10-CM

## 2019-09-12 DIAGNOSIS — Z01.419 WELL WOMAN EXAM WITH ROUTINE GYNECOLOGICAL EXAM: Primary | ICD-10-CM

## 2019-09-12 DIAGNOSIS — M79.671 PAIN IN BOTH FEET: ICD-10-CM

## 2019-09-12 LAB
BILIRUB UR QL STRIP: NEGATIVE
GLUCOSE UR-MCNC: NEGATIVE MG/DL
KETONES P FAST UR STRIP-MCNC: NEGATIVE MG/DL
PH UR STRIP: 7 [PH] (ref 4.6–8)
PROT UR QL STRIP: NEGATIVE
SP GR UR STRIP: 1.02 (ref 1–1.03)
UA UROBILINOGEN AMB POC: NORMAL (ref 0.2–1)
URINALYSIS CLARITY POC: CLEAR
URINALYSIS COLOR POC: YELLOW
URINE BLOOD POC: NORMAL
URINE LEUKOCYTES POC: NORMAL
URINE NITRITES POC: NEGATIVE

## 2019-09-12 PROCEDURE — 87102 FUNGUS ISOLATION CULTURE: CPT

## 2019-09-12 PROCEDURE — 87086 URINE CULTURE/COLONY COUNT: CPT

## 2019-09-12 PROCEDURE — 88175 CYTOPATH C/V AUTO FLUID REDO: CPT

## 2019-09-12 PROCEDURE — 87624 HPV HI-RISK TYP POOLED RSLT: CPT

## 2019-09-12 NOTE — PROGRESS NOTES
Jeannine Marcano is a 44 y.o.  female presents today for office visit for follow up. Pt would also like to discuss well woman exam. Pt is not fasting. Pt is in Room # 2      1. Have you been to the ER, urgent care clinic since your last visit? Hospitalized since your last visit? No    2. Have you seen or consulted any other health care providers outside of the [address] [place] [address] since your last visit? Include any pap smears or colon screening. No    Upcoming Appts  none    Health Maintenance reviewed       VORB: No orders of the defined types were placed in this encounter.   Robert Merrill, Lora Dodge LPN

## 2019-09-12 NOTE — PROGRESS NOTES
Internal Medicine Progress Note    Today's Date:  2019   Patient:  Prashanth Hackett  Patient :  1970    Subjective:     Chief Complaint   Patient presents with    Well Woman    Groin Pain     f/u    Vaginal Discharge     foul smelling    Foot Pain     b/l      Prashanth Hackett is a 44 y.o. female who presents for a well woman exam.     Vaginal discharge  This is a new problem. This is not at goal. It is foul smelling. Pt denies urinary symptoms. +right sided groin pain    Bilateral foot pain  This is a new problem. This is not at goal. It is located on the soles of the feet. Pt thinks it may be due to weight gain. Hypovitaminosis D  This is a new problem. This is not at goal. Pt is on vitamin D supplements    Lab Results   Component Value Date/Time    Vitamin D 25-Hydroxy 21.7 (L) 2019 11:27 AM     Obesity Class II   This is a chronic problem. This is not at goal. Pt was going to a weight loss center (Dr. David Rascon). She was on phentermine. Pt gained weight since last visit.      3 most recent PHQ Screens 2019   PHQ Not Done -   Little interest or pleasure in doing things Not at all   Feeling down, depressed, irritable, or hopeless Not at all   Total Score PHQ 2 0     Past Medical History:   Diagnosis Date    Acute bronchitis 2017    Acute right-sided thoracic back pain 2016    Breast pain, right 12/10/2014    Excess ear wax 2014    Fatigue 2014    Fibrocystic breast disease in female 2014    Iron deficiency anemia 12/10/2014    Menorrhagia 2016    Neck pain 2016    Obesity, Class II, BMI 35-39.9, no comorbidity 2014    Pollen allergies 2014    Pure hypercholesterolemia 2019    Skin tag 2014    Tinea corporis 2014    Vitamin D deficiency 2014     Past Surgical History:   Procedure Laterality Date    HX BREAST REDUCTION Bilateral 2015    BREAST REDUCTION - bilateral performed by John MACIAS Naila Dao MD at [facility] MAIN OR    HX GI      cholecystectomy    HX GYN      C section x2      reports that she has never smoked. She has never used smokeless tobacco. She reports that she drinks alcohol. She reports that she does not use drugs. Family History   Problem Relation Age of Onset    No Known Problems Mother     No Known Problems Father     No Known Problems Sister     No Known Problems Brother     No Known Problems Maternal Grandmother     Diabetes Paternal Grandmother     Lung Disease Paternal Grandfather         pneumonia    No Known Problems Brother      Allergies   Allergen Reactions    Hydrocodone-Acetaminophen Itching     Patient denies being allergic to this medication    Mustard Hives     Review of Systems   CONST:   fevers, chills  NEURO:   Lightheadedness, dizziness  HEENT: itchy eyes, runny eyes, red eyes, eye discharge, vision changes, light sensitivity  EARS:  ear pain, ear pressure, ear popping, ear discharge/drainage, hearing change  NOSE:  sneezing, runny nose  THROAT: sore throat, voice change  MOUTH: dry mouth, difficulty swallowing  CV:      chest pain, palpitations  PULM:  SOB, wheezing, +cough  GI:             nausea, vomiting  :       dysuria, hematuria, change in urine  MS:      muscle/joint pain, +right groin pain  SKIN:        rashes, skin changes  ALLERGY: seasonal allergies, itchy eyes  HEME:  easy bleeding/bruising    Current Outpatient Meds     Current Outpatient Medications on File Prior to Visit   Medication Sig Dispense Refill    ibuprofen (MOTRIN) 800 mg tablet TAKE 1 TABLET BY MOUTH EVERY 8 HOURS WITH FOOD AS NEEDED FOR PAIN 90 Tab 3    ergocalciferol (ERGOCALCIFEROL) 50,000 unit capsule Take 1 Cap by mouth every seven (7) days. 12 Cap 0    multivitamin (ONE A DAY) tablet Take 1 Tab by mouth daily. No current facility-administered medications on file prior to visit.       Objective:       Visit Vitals  /70 (BP 1 Location: Right arm, BP Patient Position: Sitting) Comment (BP 1 Location): manual   Pulse 72   Temp 97.4 °F (36.3 °C) (Oral)   Resp 16   Ht 5' 2\" (1.575 m)   Wt 210 lb (95.3 kg)   LMP 08/25/2019   SpO2 97%   BMI 38.41 kg/m²     General:   Well-groomed, well-nourished, in no distress, pleasant, alert, appropriate and conversant. Head:  Normocephalic, atraumatic  Ears:  External ears WNL, +b/l ear wax  Nose:  External nares WNL  Cardiovascular:   Regular rate and rhythm, no murmurs, no rubs, no gallops  Pulmonary:   Clear breath sounds bilaterally, good air movement, no wheezing, rales or rhonchi, normal respiratory effort  Abdomen:   Abdomen soft, nontender, nondistended  Extremities:   No edema, warm and well-perfused  Neuro:   Alert, conversant, appropriate, following commands, no focal deficits. Psych:  No pressured speech, no abnormal thought content  Breasts:  No mass in right and left breasts, no tenderness or dimpling   Pelvic:   External vagina wnl, cervix is pink with no lesions, +dischage, bimanual exam did not reveal tenderness or enlarged ovaries or uterus    Assessment/Plan & Orders:         ICD-10-CM ICD-9-CM    1. Well woman exam with routine gynecological exam Z01.419 V72.31 PAP IG, APTIMA HPV AND RFX 16/18,45 ([id number])   2. Vaginal discharge N89.8 623.5 BV+CT+NG+TV BY CLARISSA + YEAST   3. Right groin pain R10.31 789.09 CULTURE, URINE      AMB POC URINALYSIS DIP STICK AUTO W/ MICRO   4. Pain in both feet M79.671 729.5 REFERRAL TO PODIATRY    M79.672     5. Screening for breast cancer Z12.31 V76.10 JULIAN MAMMO BI SCREENING INCL CAD   6. Encounter for immunization Z23 V03.89    7. Screen for colon cancer Z12.11 V76.51 OCCULT BLOOD IMMUNOASSAY,DIAGNOSTIC      Diet and exercise  Pt to get pelvic ultrasound    Follow-up and Dispositions    · Return in about 4 weeks (around 10/10/2019) for Go over lab/imaging results, Pain, Weight management. Edwin Ortiz.  [facility] MD Mahesh - Internal Medicine  9/12/2019, 9:45 AM  [facility] [address] [facility], [address]  Phone [phone]  Fax [phone]

## 2019-09-15 LAB
BACTERIA SPEC CULT: ABNORMAL
BACTERIA SPEC CULT: ABNORMAL
SERVICE CMNT-IMP: ABNORMAL

## 2019-09-17 LAB
BACTERIAL SIALIDASE SPEC QL: NEGATIVE
C TRACH RRNA SPEC QL NAA+PROBE: NEGATIVE
N GONORRHOEA RRNA SPEC QL NAA+PROBE: NEGATIVE
SPECIMEN SOURCE: NORMAL
T VAGINALIS RRNA VAG QL NAA+PROBE: NEGATIVE
YEAST GENITAL QL CULT: NEGATIVE

## 2019-09-25 ENCOUNTER — TELEPHONE (OUTPATIENT)
Dept: FAMILY MEDICINE CLINIC | Facility: CLINIC | Age: 49
End: 2019-09-25

## 2019-09-25 NOTE — TELEPHONE ENCOUNTER
Paul called the office and wanted to get the results of the her pap smear that she had done. Can you please call her back with the results at [phone].

## 2019-10-17 ENCOUNTER — OFFICE VISIT (OUTPATIENT)
Dept: FAMILY MEDICINE CLINIC | Facility: CLINIC | Age: 49
End: 2019-10-17

## 2019-10-17 VITALS
HEART RATE: 70 BPM | HEIGHT: 62 IN | RESPIRATION RATE: 16 BRPM | DIASTOLIC BLOOD PRESSURE: 80 MMHG | TEMPERATURE: 97.7 F | OXYGEN SATURATION: 98 % | SYSTOLIC BLOOD PRESSURE: 130 MMHG | WEIGHT: 206.2 LBS | BODY MASS INDEX: 37.94 KG/M2

## 2019-10-17 DIAGNOSIS — N89.8 VAGINAL ODOR: Primary | ICD-10-CM

## 2019-10-17 DIAGNOSIS — E78.00 PURE HYPERCHOLESTEROLEMIA: ICD-10-CM

## 2019-10-17 DIAGNOSIS — E66.01 SEVERE OBESITY (HCC): ICD-10-CM

## 2019-10-17 DIAGNOSIS — E55.9 VITAMIN D DEFICIENCY: ICD-10-CM

## 2019-10-17 RX ORDER — METRONIDAZOLE 500 MG/1
500 TABLET ORAL 2 TIMES DAILY
Qty: 14 TAB | Refills: 0 | Status: SHIPPED | OUTPATIENT
Start: 2019-10-17 | End: 2019-10-24

## 2019-10-17 NOTE — PROGRESS NOTES
Internal Medicine Progress Note    Today's Date:  10/18/2019   Patient:  Mario Norris  Patient :  1970    Subjective:     Chief Complaint   Patient presents with    Pelvic Pain    Results     vaginal swabs     Vitamin D Deficiency      Obesity Class II/Hyperlpidemia  This is a chronic problem. This is not at goal. Pt was going to a weight loss center (Dr. Jd Rascon). Pt tries to eat a healthy diet. Pt lost weight since the last visit. Vaginal odor/pelvic pain  This is a chronic problem. This is not at goal. Pt douches a lot. Vaginal swab was negative for BV, gonorrhea, chlamydia or augustina. Hypovitaminosis D  This is a new problem. This is not at goal. Pt is on vitamin D supplements. Lab Results   Component Value Date/Time    Vitamin D 25-Hydroxy 21.7 (L) 2019 11:27 AM        3 most recent PHQ Screens 2019   PHQ Not Done -   Little interest or pleasure in doing things Not at all   Feeling down, depressed, irritable, or hopeless Not at all   Total Score PHQ 2 0     Past Medical History:   Diagnosis Date    Acute bronchitis 2017    Acute right-sided thoracic back pain 2016    Breast pain, right 12/10/2014    Excess ear wax 2014    Fatigue 2014    Fibrocystic breast disease in female 2014    Iron deficiency anemia 12/10/2014    Menorrhagia 2016    Neck pain 2016    Obesity, Class II, BMI 35-39.9, no comorbidity 2014    Pollen allergies 2014    Pure hypercholesterolemia 2019    Skin tag 2014    Tinea corporis 2014    Vitamin D deficiency 2014     Past Surgical History:   Procedure Laterality Date    HX BREAST REDUCTION Bilateral 2015    BREAST REDUCTION - bilateral performed by Rosalie Mejias MD at [facility] MAIN OR    HX GI      cholecystectomy    HX GYN      C section x2      reports that she has never smoked. She has never used smokeless tobacco. She reports that she drinks alcohol.  She reports that she does not use drugs. Family History   Problem Relation Age of Onset    No Known Problems Mother     No Known Problems Father     No Known Problems Sister     No Known Problems Brother     No Known Problems Maternal Grandmother     Diabetes Paternal Grandmother     Lung Disease Paternal Grandfather         pneumonia    No Known Problems Brother      Allergies   Allergen Reactions    Hydrocodone-Acetaminophen Itching     Patient denies being allergic to this medication    Mustard Hives     Review of Systems   Positives in bold  CV:      chest pain, palpitations  PULM:  SOB, wheezing, cough, sputum production    Current Outpatient Meds     Current Outpatient Medications on File Prior to Visit   Medication Sig Dispense Refill    ibuprofen (MOTRIN) 800 mg tablet TAKE 1 TABLET BY MOUTH EVERY 8 HOURS WITH FOOD AS NEEDED FOR PAIN 90 Tab 3    ergocalciferol (ERGOCALCIFEROL) 50,000 unit capsule Take 1 Cap by mouth every seven (7) days. 12 Cap 0    multivitamin (ONE A DAY) tablet Take 1 Tab by mouth daily. No current facility-administered medications on file prior to visit. Objective:       Visit Vitals  /80 (BP 1 Location: Left arm, BP Patient Position: Sitting)   Pulse 70   Temp 97.7 °F (36.5 °C) (Oral)   Resp 16   Ht 5' 2\" (1.575 m)   Wt 206 lb 3.2 oz (93.5 kg)   LMP 09/21/2019   SpO2 98%   BMI 37.71 kg/m²     General:   Well-nourished, well-groomed, pleasant, alert, in no acute distress  Head:  Normocephalic, atraumatic  Ears:  External ears WNL  Nose:  External nares WNL  Psych:  No pressured speech, no abnormal thought content    Assessment/Plan & Orders:         ICD-10-CM ICD-9-CM    1. Vaginal odor N89.8 625.8 metroNIDAZOLE (FLAGYL) 500 mg tablet   2. Severe obesity ([address] E66.01 278.01    3. Pure hypercholesterolemia E78.00 272.0    4.  Vitamin D deficiency E55.9 268.9 VITAMIN D, 25 HYDROXY      Diet and exercise  Pt to get pelvic ultrasound  May need to see gyn if problem persists    Follow-up and Dispositions    · Return in about 4 weeks (around 11/14/2019) for Pain, Weight management, vaginal odor. *Patient verbalized understanding and agreement with the plan. Patient was given an after-visit summary. Leah Aranda.  Darrick Robins MD - Internal Medicine  10/18/2019, 4:44 PM  [facility]  [address] [address], [address]  Phone [phone]  Fax [phone]

## 2019-10-23 ENCOUNTER — HOSPITAL ENCOUNTER (OUTPATIENT)
Dept: ULTRASOUND IMAGING | Age: 49
Discharge: HOME | End: 2019-10-23
Attending: INTERNAL MEDICINE
Payer: COMMERCIAL

## 2019-10-23 ENCOUNTER — TELEPHONE (OUTPATIENT)
Dept: FAMILY MEDICINE CLINIC | Facility: CLINIC | Age: 49
End: 2019-10-23

## 2019-10-23 DIAGNOSIS — R10.31 RLQ ABDOMINAL PAIN: ICD-10-CM

## 2019-10-23 DIAGNOSIS — M25.551 RIGHT HIP PAIN: ICD-10-CM

## 2019-10-23 PROCEDURE — 93975 VASCULAR STUDY: CPT

## 2019-10-23 NOTE — TELEPHONE ENCOUNTER
Patient called in to check on vitamin D prescription. She said she had her labs done today. I informed her that we have not received her lab results and we will review them in the next couple of days.

## 2019-10-30 ENCOUNTER — TELEPHONE (OUTPATIENT)
Dept: FAMILY MEDICINE CLINIC | Facility: CLINIC | Age: 49
End: 2019-10-30

## 2019-10-30 NOTE — TELEPHONE ENCOUNTER
Patient called the office this morning returning the call from the nurse to get her test results. Please call her back.

## 2019-11-05 ENCOUNTER — TELEPHONE (OUTPATIENT)
Dept: FAMILY MEDICINE CLINIC | Facility: CLINIC | Age: 49
End: 2019-11-05

## 2019-11-08 NOTE — TELEPHONE ENCOUNTER
Called pt and left message. Call back number left and I myself or one of the other nurses will attempt to contact again.     The call was to inform pt medication eval  .

## 2020-10-27 ENCOUNTER — TRANSCRIBE ORDER (OUTPATIENT)
Dept: SCHEDULING | Age: 50
End: 2020-10-27

## 2020-10-27 DIAGNOSIS — Z12.31 VISIT FOR SCREENING MAMMOGRAM: Primary | ICD-10-CM

## 2020-11-27 ENCOUNTER — HOSPITAL ENCOUNTER (OUTPATIENT)
Dept: MAMMOGRAPHY | Age: 50
Discharge: HOME | End: 2020-11-27
Attending: INTERNAL MEDICINE
Payer: COMMERCIAL

## 2020-11-27 DIAGNOSIS — Z12.31 VISIT FOR SCREENING MAMMOGRAM: ICD-10-CM

## 2020-11-27 PROCEDURE — 77063 BREAST TOMOSYNTHESIS BI: CPT

## 2022-03-03 ENCOUNTER — OFFICE VISIT (OUTPATIENT)
Dept: FAMILY MEDICINE CLINIC | Age: 52
End: 2022-03-03
Payer: COMMERCIAL

## 2022-03-03 ENCOUNTER — HOSPITAL ENCOUNTER (OUTPATIENT)
Dept: LAB | Age: 52
Discharge: HOME | End: 2022-03-03
Payer: COMMERCIAL

## 2022-03-03 VITALS
BODY MASS INDEX: 38.24 KG/M2 | WEIGHT: 207.8 LBS | DIASTOLIC BLOOD PRESSURE: 97 MMHG | HEART RATE: 80 BPM | OXYGEN SATURATION: 100 % | HEIGHT: 62 IN | SYSTOLIC BLOOD PRESSURE: 156 MMHG | TEMPERATURE: 98.2 F | RESPIRATION RATE: 16 BRPM

## 2022-03-03 DIAGNOSIS — E78.00 PURE HYPERCHOLESTEROLEMIA: ICD-10-CM

## 2022-03-03 DIAGNOSIS — R05.3 POST-COVID CHRONIC COUGH: ICD-10-CM

## 2022-03-03 DIAGNOSIS — R03.0 ELEVATED BP WITHOUT DIAGNOSIS OF HYPERTENSION: ICD-10-CM

## 2022-03-03 DIAGNOSIS — E55.9 VITAMIN D DEFICIENCY: ICD-10-CM

## 2022-03-03 DIAGNOSIS — I83.813 VARICOSE VEINS OF BOTH LOWER EXTREMITIES WITH PAIN: ICD-10-CM

## 2022-03-03 DIAGNOSIS — Z13.89 SCREENING FOR HEMATURIA OR PROTEINURIA: ICD-10-CM

## 2022-03-03 DIAGNOSIS — R53.82 CHRONIC FATIGUE: ICD-10-CM

## 2022-03-03 DIAGNOSIS — E55.9 VITAMIN D DEFICIENCY: Primary | ICD-10-CM

## 2022-03-03 DIAGNOSIS — E66.9 OBESITY (BMI 30-39.9): ICD-10-CM

## 2022-03-03 DIAGNOSIS — U09.9 POST-COVID CHRONIC COUGH: ICD-10-CM

## 2022-03-03 DIAGNOSIS — M79.605 PAIN IN BOTH LOWER EXTREMITIES: ICD-10-CM

## 2022-03-03 DIAGNOSIS — Z82.49 FAMILY HISTORY OF HYPERTENSION: ICD-10-CM

## 2022-03-03 DIAGNOSIS — Z82.61 FAMILY HISTORY OF RHEUMATOID ARTHRITIS: ICD-10-CM

## 2022-03-03 DIAGNOSIS — M79.604 PAIN IN BOTH LOWER EXTREMITIES: ICD-10-CM

## 2022-03-03 DIAGNOSIS — Z12.11 COLON CANCER SCREENING: ICD-10-CM

## 2022-03-03 LAB
25(OH)D3 SERPL-MCNC: 14.9 NG/ML (ref 30–100)
ALBUMIN SERPL-MCNC: 4 G/DL (ref 3.4–5)
ALBUMIN/GLOB SERPL: 1 {RATIO} (ref 0.8–1.7)
ALP SERPL-CCNC: 109 U/L (ref 45–117)
ALT SERPL-CCNC: 31 U/L (ref 13–56)
ANION GAP SERPL CALC-SCNC: 4 MMOL/L (ref 3–18)
APPEARANCE UR: CLEAR
AST SERPL-CCNC: 17 U/L (ref 10–38)
BACTERIA URNS QL MICRO: ABNORMAL /HPF
BASOPHILS # BLD: 0 K/UL (ref 0–0.1)
BASOPHILS NFR BLD: 1 % (ref 0–2)
BILIRUB SERPL-MCNC: 0.5 MG/DL (ref 0.2–1)
BILIRUB UR QL: NEGATIVE
BUN SERPL-MCNC: 13 MG/DL (ref 7–18)
BUN/CREAT SERPL: 16 (ref 12–20)
CALCIUM SERPL-MCNC: 9.4 MG/DL (ref 8.5–10.1)
CHLORIDE SERPL-SCNC: 105 MMOL/L (ref 100–111)
CHOLEST SERPL-MCNC: 233 MG/DL
CO2 SERPL-SCNC: 29 MMOL/L (ref 21–32)
COLOR UR: YELLOW
CREAT SERPL-MCNC: 0.8 MG/DL (ref 0.6–1.3)
CRP SERPL-MCNC: 0.8 MG/DL (ref 0–0.3)
DIFFERENTIAL METHOD BLD: ABNORMAL
EOSINOPHIL # BLD: 0.3 K/UL (ref 0–0.4)
EOSINOPHIL NFR BLD: 4 % (ref 0–5)
EPITH CASTS URNS QL MICRO: ABNORMAL /LPF (ref 0–5)
ERYTHROCYTE [DISTWIDTH] IN BLOOD BY AUTOMATED COUNT: 13.4 % (ref 11.6–14.5)
ERYTHROCYTE [SEDIMENTATION RATE] IN BLOOD: 4 MM/HR (ref 0–30)
EST. AVERAGE GLUCOSE BLD GHB EST-MCNC: 114 MG/DL
GLOBULIN SER CALC-MCNC: 3.9 G/DL (ref 2–4)
GLUCOSE SERPL-MCNC: 88 MG/DL (ref 74–99)
GLUCOSE UR STRIP.AUTO-MCNC: NEGATIVE MG/DL
HBA1C MFR BLD: 5.6 % (ref 4.2–5.6)
HCT VFR BLD AUTO: 45.1 % (ref 35–45)
HDLC SERPL-MCNC: 98 MG/DL (ref 40–60)
HDLC SERPL: 2.4 {RATIO} (ref 0–5)
HGB BLD-MCNC: 14.2 G/DL (ref 12–16)
HGB UR QL STRIP: ABNORMAL
IMM GRANULOCYTES # BLD AUTO: 0 K/UL (ref 0–0.04)
IMM GRANULOCYTES NFR BLD AUTO: 0 % (ref 0–0.5)
KETONES UR QL STRIP.AUTO: NEGATIVE MG/DL
LDLC SERPL CALC-MCNC: 112.8 MG/DL (ref 0–100)
LEUKOCYTE ESTERASE UR QL STRIP.AUTO: ABNORMAL
LIPID PROFILE,FLP: ABNORMAL
LYMPHOCYTES # BLD: 1.8 K/UL (ref 0.9–3.6)
LYMPHOCYTES NFR BLD: 28 % (ref 21–52)
MCH RBC QN AUTO: 28 PG (ref 24–34)
MCHC RBC AUTO-ENTMCNC: 31.5 G/DL (ref 31–37)
MCV RBC AUTO: 89 FL (ref 78–100)
MONOCYTES # BLD: 0.3 K/UL (ref 0.05–1.2)
MONOCYTES NFR BLD: 5 % (ref 3–10)
NEUTS SEG # BLD: 4 K/UL (ref 1.8–8)
NEUTS SEG NFR BLD: 62 % (ref 40–73)
NITRITE UR QL STRIP.AUTO: NEGATIVE
NRBC # BLD: 0 K/UL (ref 0–0.01)
NRBC BLD-RTO: 0 PER 100 WBC
PH UR STRIP: 6 [PH] (ref 5–8)
PLATELET # BLD AUTO: 228 K/UL (ref 135–420)
PMV BLD AUTO: 11.8 FL (ref 9.2–11.8)
POTASSIUM SERPL-SCNC: 4.3 MMOL/L (ref 3.5–5.5)
PROT SERPL-MCNC: 7.9 G/DL (ref 6.4–8.2)
PROT UR STRIP-MCNC: NEGATIVE MG/DL
RBC # BLD AUTO: 5.07 M/UL (ref 4.2–5.3)
RBC #/AREA URNS HPF: ABNORMAL /HPF (ref 0–5)
SODIUM SERPL-SCNC: 138 MMOL/L (ref 136–145)
SP GR UR REFRACTOMETRY: 1.01 (ref 1–1.03)
TRIGL SERPL-MCNC: 111 MG/DL (ref ?–150)
TSH SERPL DL<=0.05 MIU/L-ACNC: 1.75 UIU/ML (ref 0.36–3.74)
UROBILINOGEN UR QL STRIP.AUTO: 0.2 EU/DL (ref 0.2–1)
VLDLC SERPL CALC-MCNC: 22.2 MG/DL
WBC # BLD AUTO: 6.4 K/UL (ref 4.6–13.2)
WBC URNS QL MICRO: ABNORMAL /HPF (ref 0–4)

## 2022-03-03 PROCEDURE — 80053 COMPREHEN METABOLIC PANEL: CPT

## 2022-03-03 PROCEDURE — 80061 LIPID PANEL: CPT

## 2022-03-03 PROCEDURE — 85652 RBC SED RATE AUTOMATED: CPT

## 2022-03-03 PROCEDURE — 83036 HEMOGLOBIN GLYCOSYLATED A1C: CPT

## 2022-03-03 PROCEDURE — 86140 C-REACTIVE PROTEIN: CPT

## 2022-03-03 PROCEDURE — 86431 RHEUMATOID FACTOR QUANT: CPT

## 2022-03-03 PROCEDURE — 81001 URINALYSIS AUTO W/SCOPE: CPT

## 2022-03-03 PROCEDURE — 82306 VITAMIN D 25 HYDROXY: CPT

## 2022-03-03 PROCEDURE — 36415 COLL VENOUS BLD VENIPUNCTURE: CPT

## 2022-03-03 PROCEDURE — 84443 ASSAY THYROID STIM HORMONE: CPT

## 2022-03-03 PROCEDURE — 85025 COMPLETE CBC W/AUTO DIFF WBC: CPT

## 2022-03-03 PROCEDURE — 99214 OFFICE O/P EST MOD 30 MIN: CPT | Performed by: NURSE PRACTITIONER

## 2022-03-03 RX ORDER — GUAIFENESIN 600 MG/1
600 TABLET, EXTENDED RELEASE ORAL 2 TIMES DAILY
Qty: 60 TABLET | Refills: 1 | Status: SHIPPED | OUTPATIENT
Start: 2022-03-03 | End: 2022-04-18 | Stop reason: ALTCHOICE

## 2022-03-03 NOTE — PROGRESS NOTES
Patient declined Flu vaccine today. Patient states her last menstrual cycle was February 7, 2022. Dianne Dorman presents today for   Chief Complaint   Patient presents with   [facility] Establish Care    Cough     at night     Leg Pain     right    Arm Pain     pain        Is someone accompanying this pt? no    Is the patient using any DME equipment during OV? no    Depression Screening:  3 most recent PHQ Screens 3/3/2022   PHQ Not Done -   Little interest or pleasure in doing things Several days   Feeling down, depressed, irritable, or hopeless Not at all   Total Score PHQ 2 1       Learning Assessment:  Learning Assessment 5/28/2014   PRIMARY LEARNER Patient   PRIMARY LANGUAGE ENGLISH   LEARNER PREFERENCE PRIMARY LISTENING   ANSWERED BY patient   RELATIONSHIP SELF       Health Maintenance reviewed and discussed and ordered per Provider. Health Maintenance Due   Topic Date Due    Depression Screen  Never done    COVID-19 Vaccine (1) Never done    Colorectal Cancer Screening Combo  Never done    Shingrix Vaccine Age 50> (1 of 2) Never done    Flu Vaccine (1) Never done   . Coordination of Care:  1. Have you been to the ER, urgent care clinic since your last visit? Hospitalized since your last visit? Yes Urgent Care January 4, 2022 covid testing     2. Have you seen or consulted any other health care providers outside of the [address] [place] [address] since your last visit? Include any pap smears or colon screening. no      3. For patients aged 39-70: Has the patient had a colonoscopy / FIT/ Cologuard? No Patient was put in for a Cologuard test    If the patient is female:    4. For patients aged 41-77: Has the patient had a mammogram within the past 2 years? Yes       5. For patients aged 21-65: Has the patient had a pap smear?  Yes - no Care Gap present

## 2022-03-03 NOTE — PROGRESS NOTES
The Hugh Millan 45 y.o. female presents today for:    Chief Complaint   Patient presents with   [place] Establish Care    Cough     at night     Leg Pain     right    Arm Pain     pain    LNMP 2-7-2022    Patient is having widespread pain since 2/2022 after having covid. Weakness in arms. Pt has chronic cough since having covid. Review of Systems   Constitutional: Positive for malaise/fatigue. Negative for chills, fever and weight loss. HENT: Negative. Eyes: Negative. Respiratory: Positive for cough. Negative for hemoptysis, sputum production, shortness of breath and wheezing. Cardiovascular: Negative for chest pain, palpitations and leg swelling. Gastrointestinal: Negative for abdominal pain, blood in stool, constipation, diarrhea and vomiting. Genitourinary: Negative for frequency, hematuria and urgency. Musculoskeletal: Negative for falls and neck pain. Skin: Negative. Negative for itching and rash. Neurological: Negative. Negative for dizziness and headaches. Endo/Heme/Allergies: Bruises/bleeds easily. Psychiatric/Behavioral: Negative for depression and suicidal ideas. The patient is not nervous/anxious and does not have insomnia.         Health Maintenance Due   Topic Date Due    Colorectal Cancer Screening Combo  Never done    Shingrix Vaccine Age 50> (1 of 2) Never done    COVID-19 Vaccine (3 - Booster for [employer] series) 09/22/2021        Past Medical History:   Diagnosis Date    Acute bronchitis 4/11/2017    Acute right-sided thoracic back pain 6/23/2016    Breast pain, right 12/10/2014    Excess ear wax 6/26/2014    Fatigue 5/28/2014    Fibrocystic breast disease in female 6/26/2014    Iron deficiency anemia 12/10/2014    Menorrhagia 2/25/2016    Neck pain 6/23/2016    Obesity, Class II, BMI 35-39.9, no comorbidity 5/28/2014    Pollen allergies 5/28/2014    Pure hypercholesterolemia 7/31/2019    Skin tag 6/26/2014    Tinea corporis 6/26/2014    Vitamin D deficiency 6/26/2014       Physical Exam  Constitutional:       General: She is not in acute distress. Appearance: She is obese. She is not toxic-appearing. Cardiovascular:      Rate and Rhythm: Normal rate and regular rhythm. Pulses: Normal pulses. Heart sounds: Normal heart sounds. No murmur heard. Pulmonary:      Breath sounds: Examination of the right-lower field reveals decreased breath sounds. Decreased breath sounds present. Abdominal:      Palpations: Abdomen is soft. Tenderness: There is no abdominal tenderness. There is no guarding. Musculoskeletal:         General: Tenderness present. Comments: Varicose veins both legs     Skin:     Capillary Refill: Capillary refill takes less than 2 seconds. Findings: No bruising. Neurological:      General: No focal deficit present. Mental Status: She is alert and oriented to person, place, and time. Visit Vitals  BP (!) 156/97 (BP 1 Location: Right arm, BP Patient Position: Sitting)   Pulse 80   Temp 98.2 °F (36.8 °C) (Temporal)   Resp 16   Ht 5' 2\" (1.575 m)   Wt 207 lb 12.8 oz (94.3 kg)   SpO2 100%   BMI 38.01 kg/m²       Current Outpatient Medications:     guaiFENesin ER (MUCINEX) 600 mg ER tablet, Take 1 Tablet by mouth two (2) times a day., Disp: 60 Tablet, Rfl: 1    ibuprofen (MOTRIN) 800 mg tablet, Take 1 Tablet by mouth every eight (8) hours as needed for Pain., Disp: 30 Tablet, Rfl: 1    ergocalciferol (ERGOCALCIFEROL) 50,000 unit capsule, Take 1 Cap by mouth every seven (7) days. , Disp: 12 Cap, Rfl: 0    multivitamin (ONE A DAY) tablet, Take 1 Tab by mouth daily. , Disp: , Rfl:   ASSESSMENT and PLAN    ICD-10-CM ICD-9-CM    1. Vitamin D deficiency  E55.9 268.9 VITAMIN D, 25 HYDROXY   2. Pure hypercholesterolemia  E78.00 272.0 LIPID PANEL   3.  Varicose veins of both lower extremities with pain  I83.813 454.8 DUPLEX LOWER EXT ARTERY BILAT      REFERRAL TO VASCULAR SURGERY   4. Colon cancer screening Z12.11 V76.51 COLOGUARD TEST (FECAL DNA COLORECTAL CANCER SCREENING)   5. Pain in both lower extremities  M79.604 729.5 DUPLEX LOWER EXT ARTERY BILAT    M79.605  DUPLEX UPPER EXT VENOUS BILAT   6. Family history of rheumatoid arthritis  Z82.61 V17.7 SED RATE (ESR)      RHEUMATOID FACTOR, IGM      C REACTIVE PROTEIN, QT   7. Post-COVID chronic cough  R05.3 786.2 XR CHEST PA LAT    U09.9 139.8 CBC WITH AUTOMATED DIFF      guaiFENesin ER (MUCINEX) 600 mg ER tablet   8. Family history of hypertension  Z82.49 V17.49    9. Elevated BP without diagnosis of hypertension  Z48.8 245.4 METABOLIC PANEL, COMPREHENSIVE      LIPID PANEL   10. Obesity (BMI 30-39. 9)  E66.9 278.00 HEMOGLOBIN A1C WITH EAG   11. Chronic fatigue  R53.82 780.79 TSH 3RD GENERATION   12.  Screening for hematuria or proteinuria  Z13.89 V82.9 URINALYSIS W/ RFLX MICROSCOPIC     Follow-up and Dispositions    · Return in about 1 week (around 3/10/2022) for BP check-nurse visit and 1 month f/u.       lab results and schedule of future lab studies reviewed with patient  reviewed diet, exercise and weight control  cardiovascular risk and specific lipid/LDL goals reviewed  reviewed medications and side effects in detail  radiology results and schedule of future radiology studies reviewed with patient    Rodger Benton NP

## 2022-03-10 LAB — CANNABINOIDS BLD CFM-MCNC: <7 U (ref 0–15)

## 2022-03-11 DIAGNOSIS — N39.0 URINARY TRACT INFECTION WITH HEMATURIA, SITE UNSPECIFIED: ICD-10-CM

## 2022-03-11 DIAGNOSIS — E78.00 PURE HYPERCHOLESTEROLEMIA: Primary | ICD-10-CM

## 2022-03-11 DIAGNOSIS — R31.9 URINARY TRACT INFECTION WITH HEMATURIA, SITE UNSPECIFIED: ICD-10-CM

## 2022-03-11 DIAGNOSIS — E55.9 VITAMIN D DEFICIENCY: ICD-10-CM

## 2022-03-11 RX ORDER — ERGOCALCIFEROL 1.25 MG/1
50000 CAPSULE ORAL
Qty: 12 CAPSULE | Refills: 1 | Status: SHIPPED | OUTPATIENT
Start: 2022-03-11

## 2022-03-11 RX ORDER — NITROFURANTOIN 25; 75 MG/1; MG/1
100 CAPSULE ORAL 2 TIMES DAILY
Qty: 14 CAPSULE | Refills: 0 | Status: SHIPPED | OUTPATIENT
Start: 2022-03-11 | End: 2022-04-18 | Stop reason: ALTCHOICE

## 2022-03-11 RX ORDER — ATORVASTATIN CALCIUM 10 MG/1
10 TABLET, FILM COATED ORAL DAILY
Qty: 90 TABLET | Refills: 1 | Status: SHIPPED | OUTPATIENT
Start: 2022-03-11

## 2022-03-11 NOTE — PROGRESS NOTES
Can you please call patient to let the patient know that her labs are abnormal. Vitamin D is low, pt has leukocytes and blood in urine. A1c is normal. Cholesterol is high at 233 and CRP is elevated. Vit D sent to pharmacy and cholesterol medication. Will place on antibiotics for possible UTI as well. Pt needs follow up appointment in 1 month too. Thanks!

## 2022-03-18 PROBLEM — E66.01 SEVERE OBESITY (HCC): Status: ACTIVE | Noted: 2019-07-30

## 2022-03-18 PROBLEM — E78.00 PURE HYPERCHOLESTEROLEMIA: Status: ACTIVE | Noted: 2019-07-31

## 2022-03-22 ENCOUNTER — TELEPHONE (OUTPATIENT)
Dept: FAMILY MEDICINE CLINIC | Age: 52
End: 2022-03-22

## 2022-03-22 DIAGNOSIS — I10 PRIMARY HYPERTENSION: Primary | ICD-10-CM

## 2022-03-22 NOTE — TELEPHONE ENCOUNTER
PT states change in BP medicine has made it worse, complains of new eye twitch as well. Please return call.

## 2022-03-24 ENCOUNTER — TELEPHONE (OUTPATIENT)
Dept: FAMILY MEDICINE CLINIC | Age: 52
End: 2022-03-24

## 2022-03-24 RX ORDER — LOSARTAN POTASSIUM AND HYDROCHLOROTHIAZIDE 12.5; 5 MG/1; MG/1
1 TABLET ORAL DAILY
Qty: 30 TABLET | Refills: 0 | Status: SHIPPED | OUTPATIENT
Start: 2022-03-24 | End: 2022-05-20 | Stop reason: SDUPTHER

## 2022-03-24 NOTE — TELEPHONE ENCOUNTER
Pt states eye twitching has worsened. Denies chest pain or SOB. -160's at home are elevated. Will start Losartan-HCTZ; pt aware and will start tomorrow.

## 2022-03-28 ENCOUNTER — TELEPHONE (OUTPATIENT)
Dept: FAMILY MEDICINE CLINIC | Age: 52
End: 2022-03-28

## 2022-03-28 ENCOUNTER — DOCUMENTATION ONLY (OUTPATIENT)
Dept: FAMILY MEDICINE CLINIC | Age: 52
End: 2022-03-28

## 2022-03-28 NOTE — TELEPHONE ENCOUNTER
Per patient she and  are going through a divorce. Patient has NOT and DOES NOT give permission to anyone to speak to  about patient.

## 2022-03-28 NOTE — TELEPHONE ENCOUNTER
----- Message from [facility] sent at 3/28/2022  8:16 AM EDT -----  Subject: Message to Provider    QUESTIONS  Information for Provider? Doug Trejo , would like to speak   with Arsen Castillo concerning medications, treatments, and prognosis. He   stated that she is confused about her medicatiosn. Please call him at her   earliest convinence. His number is [phone]  ---------------------------------------------------------------------------  --------------  CALL BACK INFO  What is the best way for the office to contact you? OK to leave message on   voicemail  Preferred Call Back Phone Number? [phone]  ---------------------------------------------------------------------------  --------------  SCRIPT ANSWERS  Relationship to Patient? Third Party  Third Party Type? Other  Other Third Party Type? 1 [facility]  Representative Name?  Brenda Kaiser

## 2022-03-28 NOTE — PROGRESS NOTES
Spoke to patient. She stated she and her  are going through a divorce. She stated, no one is to speak to  about her medical information.

## 2022-04-18 ENCOUNTER — VIRTUAL VISIT (OUTPATIENT)
Dept: FAMILY MEDICINE CLINIC | Age: 52
End: 2022-04-18
Payer: COMMERCIAL

## 2022-04-18 DIAGNOSIS — I83.813 VARICOSE VEINS OF BOTH LOWER EXTREMITIES WITH PAIN: ICD-10-CM

## 2022-04-18 DIAGNOSIS — E78.00 PURE HYPERCHOLESTEROLEMIA: ICD-10-CM

## 2022-04-18 DIAGNOSIS — I10 PRIMARY HYPERTENSION: Primary | ICD-10-CM

## 2022-04-18 DIAGNOSIS — C41.9 OSTEOSARCOMA (HCC): ICD-10-CM

## 2022-04-18 PROBLEM — M79.604 PAIN IN BOTH LOWER EXTREMITIES: Status: ACTIVE | Noted: 2022-03-29

## 2022-04-18 PROBLEM — M79.605 PAIN IN BOTH LOWER EXTREMITIES: Status: ACTIVE | Noted: 2022-03-29

## 2022-04-18 PROCEDURE — 99213 OFFICE O/P EST LOW 20 MIN: CPT | Performed by: NURSE PRACTITIONER

## 2022-04-18 RX ORDER — ASCORBIC ACID 250 MG
250 TABLET ORAL DAILY
COMMUNITY

## 2022-04-18 RX ORDER — PHENTERMINE HYDROCHLORIDE 37.5 MG/1
1 TABLET ORAL
COMMUNITY
Start: 2022-03-11

## 2022-04-18 RX ORDER — UREA 10 %
0.8 LOTION (ML) TOPICAL DAILY
COMMUNITY

## 2022-04-18 RX ORDER — TOPIRAMATE 50 MG/1
50 TABLET, FILM COATED ORAL 2 TIMES DAILY
COMMUNITY
Start: 2022-03-11

## 2022-04-18 RX ORDER — TRAZODONE HYDROCHLORIDE 50 MG/1
TABLET ORAL
COMMUNITY
Start: 2022-03-11

## 2022-04-18 NOTE — PROGRESS NOTES
Noam Shahab presents today for   Chief Complaint   Patient presents with    Follow-up    Diabetes       Virtual/telephone visit    Depression Screening:  3 most recent PHQ Screens 3/3/2022   PHQ Not Done -   Little interest or pleasure in doing things Several days   Feeling down, depressed, irritable, or hopeless Not at all   Total Score PHQ 2 1       Learning Assessment:  Learning Assessment 5/28/2014   PRIMARY LEARNER Patient   PRIMARY LANGUAGE ENGLISH   LEARNER PREFERENCE PRIMARY LISTENING   ANSWERED BY patient   RELATIONSHIP SELF       Health Maintenance reviewed and discussed and ordered per Provider. Health Maintenance Due   Topic Date Due    Colorectal Cancer Screening Combo  Never done    Shingrix Vaccine Age 50> (1 of 2) Never done    COVID-19 Vaccine (3 - Booster for [employer] series) 09/22/2021   . Coordination of Care:  1. Have you been to the ER, urgent care clinic since your last visit? Hospitalized since your last visit? no    2. Have you seen or consulted any other health care providers outside of the [address] [place] [address] since your last visit? Include any pap smears or colon screening. No      3. For patients aged 39-70: Has the patient had a colonoscopy / FIT/ Cologuard? No Patient has the cologuard box       If the patient is female:    4. For patients aged 41-77: Has the patient had a mammogram within the past 2 years? Yes - no Care Gap present scheduled for May 2022.       5. For patients aged 21-65: Has the patient had a pap smear?  Yes - no Care Gap present

## 2022-04-18 NOTE — PROGRESS NOTES
Apoorva Hardy (: 1970) is a 45 y.o. female, established patient, here for evaluation of the following chief complaint(s):   Follow-up and Diabetes     Vascular MD-changed to different testing Tereza White)   --compression stockings have helped as well   --PVL scheduled    --Dr. Clayton Acosta referred to new oncologist     Osteosarcoma-2020  --will be assigned cancer MD here from [place]     Patient's  present during video conference. This provider did not have this information regarding previous diagnosis of osteosarcoma in [place]. Foot xray-splint for leg and cortisone injection    2-3 lbs weight loss     ASSESSMENT/PLAN:  Below is the assessment and plan developed based on review of pertinent history, labs, studies, and medications. 1. Primary hypertension  2. Pure hypercholesterolemia  3. Osteosarcoma ([address]  4. Varicose veins of both lower extremities with pain      No follow-ups on file.     SUBJECTIVE/OBJECTIVE:  HPI    Review of Systems     No data recorded       Physical Exam    [INSTRUCTIONS:  \"[x]\" Indicates a positive item  \"[]\" Indicates a negative item  -- DELETE ALL ITEMS NOT EXAMINED]    Constitutional: [x] Appears well-developed and well-nourished [x] No apparent distress      [] Abnormal -     Mental status: [x] Alert and awake  [x] Oriented to person/place/time [x] Able to follow commands    [] Abnormal -     Eyes:   EOM    [x]  Normal    [] Abnormal -   Sclera  [x]  Normal    [] Abnormal -          Discharge [x]  None visible   [] Abnormal -     HENT: [x] Normocephalic, atraumatic  [] Abnormal -   [x] Mouth/Throat: Mucous membranes are moist    External Ears [x] Normal  [] Abnormal -    Neck: [x] No visualized mass [] Abnormal -     Pulmonary/Chest: [x] Respiratory effort normal   [x] No visualized signs of difficulty breathing or respiratory distress        [] Abnormal -      Musculoskeletal:   [x] Normal gait with no signs of ataxia         [x] Normal range of motion of neck [] Abnormal -     Neurological:        [x] No Facial Asymmetry (Cranial nerve 7 motor function) (limited exam due to video visit)          [x] No gaze palsy        [] Abnormal -          Skin:        [x] No significant exanthematous lesions or discoloration noted on facial skin         [] Abnormal -            Psychiatric:       [x] Normal Affect [] Abnormal -        [x] No Hallucinations    Other pertinent observable physical exam findings:-        On this date 04/18/2022 I have spent 5 minutes reviewing previous notes, test results and face to face (virtual) with the patient discussing the diagnosis and importance of compliance with the treatment plan as well as documenting on the day of the visit. Twin Cardoso, was evaluated through a synchronous (real-time) audio-video encounter. The patient (or guardian if applicable) is aware that this is a billable service, which includes applicable co-pays. Verbal consent to proceed has been obtained. The visit was conducted pursuant to the emergency declaration under the [address] [place] [address] authority and the [employer] and [employer] General Act. Patient identification was verified, and a caregiver was present when appropriate. The patient was located at home in a state where the provider was licensed to provide care. An electronic signature was used to authenticate this note.   -- Sherry Bahena NP

## 2022-05-05 ENCOUNTER — PATIENT MESSAGE (OUTPATIENT)
Dept: FAMILY MEDICINE CLINIC | Age: 52
End: 2022-05-05

## 2022-05-05 NOTE — TELEPHONE ENCOUNTER
Pt's menstrual period started 4- and have been continual period since then. Pt states having heavy bleeding, clots and now feeling weak. /99. Pt states feels dizzy too; denies chest pain or SOB. Pt states has oncologist appointment on   5/13     --diagnosed with osteosarcoma in [place]? Advised pt to go to nearest ER for further evaluation; pt agrees and  will transport now.

## 2022-05-05 NOTE — TELEPHONE ENCOUNTER
From: Del Rocha  To: [facility]  Sent: 5/5/2022 1:51 PM EDT  Subject: Bleeding and Weakness     I have been on my cycle since 4/12/21 very heavy flow and bad cramping.  Please have someone call me [phone]

## 2022-05-20 DIAGNOSIS — I10 PRIMARY HYPERTENSION: ICD-10-CM

## 2022-05-20 RX ORDER — LOSARTAN POTASSIUM AND HYDROCHLOROTHIAZIDE 12.5; 5 MG/1; MG/1
1 TABLET ORAL DAILY
Qty: 30 TABLET | Refills: 0 | Status: SHIPPED | OUTPATIENT
Start: 2022-05-20 | End: 2022-06-27 | Stop reason: SDUPTHER

## 2022-06-27 DIAGNOSIS — I10 PRIMARY HYPERTENSION: ICD-10-CM

## 2022-06-27 RX ORDER — LOSARTAN POTASSIUM AND HYDROCHLOROTHIAZIDE 12.5; 5 MG/1; MG/1
1 TABLET ORAL DAILY
Qty: 30 TABLET | Refills: 0 | Status: SHIPPED | OUTPATIENT
Start: 2022-06-27 | End: 2022-09-19 | Stop reason: SDUPTHER

## 2022-07-18 ENCOUNTER — OFFICE VISIT (OUTPATIENT)
Dept: FAMILY MEDICINE CLINIC | Age: 52
End: 2022-07-18
Payer: COMMERCIAL

## 2022-07-18 VITALS
OXYGEN SATURATION: 97 % | TEMPERATURE: 98.3 F | HEIGHT: 62 IN | HEART RATE: 83 BPM | BODY MASS INDEX: 39.01 KG/M2 | DIASTOLIC BLOOD PRESSURE: 88 MMHG | SYSTOLIC BLOOD PRESSURE: 136 MMHG | RESPIRATION RATE: 16 BRPM | WEIGHT: 212 LBS

## 2022-07-18 DIAGNOSIS — Z85.3 HISTORY OF BREAST CANCER: ICD-10-CM

## 2022-07-18 DIAGNOSIS — I10 PRIMARY HYPERTENSION: ICD-10-CM

## 2022-07-18 DIAGNOSIS — Z85.3 HISTORY OF BILATERAL BREAST CANCER: ICD-10-CM

## 2022-07-18 DIAGNOSIS — M79.605 LEFT LEG PAIN: Primary | ICD-10-CM

## 2022-07-18 DIAGNOSIS — I83.813 VARICOSE VEINS OF BOTH LOWER EXTREMITIES WITH PAIN: ICD-10-CM

## 2022-07-18 PROCEDURE — 99214 OFFICE O/P EST MOD 30 MIN: CPT | Performed by: NURSE PRACTITIONER

## 2022-07-18 NOTE — PROGRESS NOTES
Patient do not have covid booster. Marco Fermin presents today for   Chief Complaint   Patient presents with    Follow-up     3 month        Is someone accompanying this pt? no    Is the patient using any DME equipment during OV? no    Depression Screening:  3 most recent PHQ Screens 7/18/2022   PHQ Not Done -   Little interest or pleasure in doing things Not at all   Feeling down, depressed, irritable, or hopeless Not at all   Total Score PHQ 2 0       Learning Assessment:  Learning Assessment 5/28/2014   PRIMARY LEARNER Patient   PRIMARY LANGUAGE ENGLISH   LEARNER PREFERENCE PRIMARY LISTENING   ANSWERED BY patient   RELATIONSHIP SELF       Health Maintenance reviewed and discussed and ordered per Provider. Health Maintenance Due   Topic Date Due    Shingrix Vaccine Age 49> (1 of 2) Never done    COVID-19 Vaccine (3 - Booster for Desir Peter series) 09/22/2021   . Coordination of Care:  1. Have you been to the ER, urgent care clinic since your last visit? Hospitalized since your last visit? no    2. Have you seen or consulted any other health care providers outside of the [address] [place] [address] since your last visit? Include any pap smears or colon screening. Yes [facility] Vascular Specialist         3. For patients aged 39-70: Has the patient had a colonoscopy / FIT/ Cologuard? Yes - no Care Gap present      If the patient is female:    4. For patients aged 41-77: Has the patient had a mammogram within the past 2 years? Yes - no Care Gap present      5. For patients aged 21-65: Has the patient had a pap smear?  Yes - no Care Gap present

## 2022-07-18 NOTE — PROGRESS NOTES
The Zoila Gomez 45 y.o. female presents today for:    Chief Complaint   Patient presents with    Follow-up     3 month      Left calf fullness is not veins; saw vascular doctor and does not have vein insufficiency, DVT   --Dr. Larisa Read if diagnosis osteosarcoma after living in [place]    --no records available from [place]      History of breast cancer    Left leg MRI ordered STAT. Based on results, may need ortho oncology referral to Dr. Zach Duran [facility]   Cardiovascular: Positive for leg swelling. Negative for chest pain and palpitations. Genitourinary: Negative for frequency, hematuria and urgency. Health Maintenance Due   Topic Date Due    Shingrix Vaccine Age 49> (1 of 2) Never done    COVID-19 Vaccine (3 - Booster for Desir Peter series) 09/22/2021        Past Medical History:   Diagnosis Date    Acute bronchitis 4/11/2017    Acute right-sided thoracic back pain 6/23/2016    Breast pain, right 12/10/2014    Excess ear wax 6/26/2014    Fatigue 5/28/2014    Fibrocystic breast disease in female 6/26/2014    Iron deficiency anemia 12/10/2014    Menorrhagia 2/25/2016    Neck pain 6/23/2016    Obesity, Class II, BMI 35-39.9, no comorbidity 5/28/2014    Pollen allergies 5/28/2014    Pure hypercholesterolemia 7/31/2019    Skin tag 6/26/2014    Tinea corporis 6/26/2014    Vitamin D deficiency 6/26/2014       Physical Exam  Cardiovascular:      Rate and Rhythm: Normal rate and regular rhythm. Pulmonary:      Effort: No respiratory distress. Breath sounds: Normal breath sounds. No stridor. No wheezing. Musculoskeletal:         General: Swelling present. Comments: Softball sized mobile mass to left calf          ASSESSMENT and PLAN    ICD-10-CM ICD-9-CM    1. Left leg pain  M79.605 729.5 MRI TIB/FIB LT W WO CONT      REFERRAL TO ONCOLOGY      2. History of breast cancer  Z85.3 V10.3 MRI TIB/FIB LT W WO CONT      3. Primary hypertension  I10 401.9       4.  Varicose veins of both lower extremities with pain  I83.813 454.8       5. History of bilateral breast cancer  Z85.3 V10.3 REFERRAL TO ONCOLOGY      STAT MRI ordered    Based on MRI left leg results; will change referral to ortho oncology referral to Dr. Izabel Weathers.     reviewed diet, exercise and weight control  reviewed medications and side effects in detail    Angelina Downey NP

## 2022-07-21 ENCOUNTER — VIRTUAL VISIT (OUTPATIENT)
Dept: FAMILY MEDICINE CLINIC | Age: 52
End: 2022-07-21
Payer: COMMERCIAL

## 2022-07-21 DIAGNOSIS — R07.89 OTHER CHEST PAIN: ICD-10-CM

## 2022-07-21 DIAGNOSIS — R22.42 SKIN LUMP OF LEG, LEFT: ICD-10-CM

## 2022-07-21 DIAGNOSIS — R04.2 COUGH WITH HEMOPTYSIS: Primary | ICD-10-CM

## 2022-07-21 PROCEDURE — 99215 OFFICE O/P EST HI 40 MIN: CPT | Performed by: NURSE PRACTITIONER

## 2022-07-21 NOTE — PROGRESS NOTES
Phyllis Shepard (: 1970) is a 45 y.o. female, established patient, here for evaluation of the following chief complaint(s):   Cold Symptoms (Patient states she had this for a while, patient states she had gotten worse. She states she was coughing up blood last night. ) and Eye Pain (Both eyes red )     MRI left leg-scheduled for next week    Patient having difficulty breathing and states coughed up blood last night. Pt states has SOB; advised patient to call 911. Pt states  will drive her to ER NOW. ASSESSMENT/PLAN:  Below is the assessment and plan developed based on review of pertinent history, labs, studies, and medications. 1. Cough with hemoptysis  2. Other chest pain  3. Skin lump of leg, left    No follow-ups on file. SUBJECTIVE/OBJECTIVE:  HPI    Review of Systems   Respiratory:  Positive for cough and chest tightness. Cardiovascular:  Positive for chest pain.       No data recorded     Physical Exam    [INSTRUCTIONS:  \"[x]\" Indicates a positive item  \"[]\" Indicates a negative item  -- DELETE ALL ITEMS NOT EXAMINED]    Constitutional: [x] Appears well-developed and well-nourished [x] No apparent distress      [] Abnormal -     Mental status: [x] Alert and awake  [x] Oriented to person/place/time [x] Able to follow commands    [] Abnormal -     Eyes:   EOM    [x]  Normal    [] Abnormal -   Sclera  [x]  Normal    [] Abnormal -          Discharge [x]  None visible   [] Abnormal -     HENT: [x] Normocephalic, atraumatic  [] Abnormal -   [x] Mouth/Throat: Mucous membranes are moist    External Ears [x] Normal  [] Abnormal -    Neck: [x] No visualized mass [] Abnormal -     Pulmonary/Chest: [x] Respiratory effort normal   [x] No visualized signs of difficulty breathing or respiratory distress        [] Abnormal -      Musculoskeletal:   [x] Normal gait with no signs of ataxia         [x] Normal range of motion of neck        [] Abnormal -     Neurological:        [x] No Facial Asymmetry (Cranial nerve 7 motor function) (limited exam due to video visit)          [x] No gaze palsy        [] Abnormal -          Skin:        [x] No significant exanthematous lesions or discoloration noted on facial skin         [] Abnormal -            Psychiatric:       [x] Normal Affect [] Abnormal -        [x] No Hallucinations    Other pertinent observable physical exam findings:-    On this date 07/21/2022 I have spent 5 minutes reviewing previous notes, test results and face to face (virtual) with the patient discussing the diagnosis and importance of compliance with the treatment plan as well as documenting on the day of the visit. Debby Somers, was evaluated through a synchronous (real-time) audio-video encounter. The patient (or guardian if applicable) is aware that this is a billable service, which includes applicable co-pays. This Virtual Visit was conducted with patient's (and/or legal guardian's) consent. The visit was conducted pursuant to the emergency declaration under the [address] [place] [address] authority and the [employer] and [employer] General Act. Patient identification was verified, and a caregiver was present when appropriate. The patient was located at: Home: [address] [facility], [address] [place] [address]  The provider was located at: Facility [facility] [address] [place] [address]  [phone]       An electronic signature was used to authenticate this note.   -- Ryan De La Torre NP

## 2022-07-21 NOTE — PROGRESS NOTES
Nato Tristan presents today for   Chief Complaint   Patient presents with    Cold Symptoms     Patient states she had this for a while, patient states she had gotten worse. She states she was coughing up blood last night. Virtual/telephone visit    Depression Screening:  3 most recent PHQ Screens 7/18/2022   PHQ Not Done -   Little interest or pleasure in doing things Not at all   Feeling down, depressed, irritable, or hopeless Not at all   Total Score PHQ 2 0       Learning Assessment:  Learning Assessment 5/28/2014   PRIMARY LEARNER Patient   PRIMARY LANGUAGE ENGLISH   LEARNER PREFERENCE PRIMARY LISTENING   ANSWERED BY patient   RELATIONSHIP SELF       Health Maintenance reviewed and discussed and ordered per Provider. Health Maintenance Due   Topic Date Due    Shingrix Vaccine Age 49> (1 of 2) Never done    COVID-19 Vaccine (3 - Booster for Desir Peter series) 09/22/2021   . Coordination of Care:  1. Have you been to the ER, urgent care clinic since your last visit? Hospitalized since your last visit? no    2. Have you seen or consulted any other health care providers outside of the [address] [place] [address] since your last visit? Include any pap smears or colon screening. No         3. For patients aged 39-70: Has the patient had a colonoscopy / FIT/ Cologuard? Yes - no Care Gap present      If the patient is female:    4. For patients aged 41-77: Has the patient had a mammogram within the past 2 years? Yes - no Care Gap present      5. For patients aged 21-65: Has the patient had a pap smear?  Yes - no Care Gap present

## 2022-08-05 ENCOUNTER — TELEPHONE (OUTPATIENT)
Dept: FAMILY MEDICINE CLINIC | Age: 52
End: 2022-08-05

## 2022-08-05 NOTE — TELEPHONE ENCOUNTER
This provider concerned that patient has not completed MRI of left leg has not been completed. Pt states the MRI has been scheduled for 8- at the [facility].

## 2022-09-13 ENCOUNTER — HOSPITAL ENCOUNTER (OUTPATIENT)
Dept: MRI IMAGING | Age: 52
Discharge: HOME | End: 2022-09-13
Attending: NURSE PRACTITIONER
Payer: COMMERCIAL

## 2022-09-13 DIAGNOSIS — M79.605 LEFT LEG PAIN: ICD-10-CM

## 2022-09-13 DIAGNOSIS — Z85.3 HISTORY OF BREAST CANCER: ICD-10-CM

## 2022-09-13 LAB — CREAT UR-MCNC: 0.8 MG/DL (ref 0.6–1.3)

## 2022-09-13 PROCEDURE — 82565 ASSAY OF CREATININE: CPT

## 2022-09-13 PROCEDURE — 74011250636 HC RX REV CODE- 250/636: Performed by: NURSE PRACTITIONER

## 2022-09-13 PROCEDURE — A9577 INJ MULTIHANCE: HCPCS | Performed by: NURSE PRACTITIONER

## 2022-09-13 PROCEDURE — 73720 MRI LWR EXTREMITY W/O&W/DYE: CPT

## 2022-09-13 RX ADMIN — GADOBENATE DIMEGLUMINE 17 ML: 529 INJECTION, SOLUTION INTRAVENOUS at 12:47

## 2022-09-16 NOTE — PROGRESS NOTES
Can you please call patient to let the patient know that her MRI of left leg is normal. No mass noted.

## 2022-09-19 DIAGNOSIS — I10 PRIMARY HYPERTENSION: ICD-10-CM

## 2022-09-19 RX ORDER — LOSARTAN POTASSIUM AND HYDROCHLOROTHIAZIDE 12.5; 5 MG/1; MG/1
1 TABLET ORAL DAILY
Qty: 30 TABLET | Refills: 0 | Status: SHIPPED | OUTPATIENT
Start: 2022-09-19

## 2022-09-23 NOTE — PROGRESS NOTES
1st attempt to call patient to inform of MRI results. No answer; left voicemail for patient to call office back.

## 2022-09-29 NOTE — PROGRESS NOTES
1st attempt calling patient in regards of lab results. Patient did not answer. Left message stating to call the office for appointment.

## 2022-09-29 NOTE — PROGRESS NOTES
2nd attempt to call patient to inform of Mri results. No answer; left voicemail for patient to call office back.

## 2023-10-24 ENCOUNTER — HOSPITAL ENCOUNTER (OUTPATIENT)
Facility: HOSPITAL | Age: 53
Setting detail: SPECIMEN
Discharge: HOME | End: 2023-10-27
Payer: COMMERCIAL

## 2023-10-24 ENCOUNTER — OFFICE VISIT (OUTPATIENT)
Facility: CLINIC | Age: 53
End: 2023-10-24
Payer: COMMERCIAL

## 2023-10-24 VITALS
HEART RATE: 86 BPM | BODY MASS INDEX: 38.24 KG/M2 | HEIGHT: 62 IN | TEMPERATURE: 98 F | RESPIRATION RATE: 16 BRPM | SYSTOLIC BLOOD PRESSURE: 126 MMHG | WEIGHT: 207.8 LBS | DIASTOLIC BLOOD PRESSURE: 62 MMHG | OXYGEN SATURATION: 100 %

## 2023-10-24 DIAGNOSIS — I10 ESSENTIAL (PRIMARY) HYPERTENSION: ICD-10-CM

## 2023-10-24 DIAGNOSIS — Z12.4 ENCOUNTER FOR PAPANICOLAOU SMEAR FOR CERVICAL CANCER SCREENING: ICD-10-CM

## 2023-10-24 DIAGNOSIS — Z12.39 BREAST CANCER SCREENING OTHER THAN MAMMOGRAM: ICD-10-CM

## 2023-10-24 DIAGNOSIS — E78.00 PURE HYPERCHOLESTEROLEMIA, UNSPECIFIED: ICD-10-CM

## 2023-10-24 DIAGNOSIS — Z11.3 SCREENING EXAMINATION FOR STD (SEXUALLY TRANSMITTED DISEASE): ICD-10-CM

## 2023-10-24 DIAGNOSIS — Z12.31 BREAST CANCER SCREENING BY MAMMOGRAM: ICD-10-CM

## 2023-10-24 DIAGNOSIS — Z00.00 PHYSICAL EXAM, ANNUAL: ICD-10-CM

## 2023-10-24 DIAGNOSIS — M79.605 PAIN IN LEFT LEG: Primary | ICD-10-CM

## 2023-10-24 PROCEDURE — 87624 HPV HI-RISK TYP POOLED RSLT: CPT

## 2023-10-24 PROCEDURE — 87801 DETECT AGNT MULT DNA AMPLI: CPT

## 2023-10-24 PROCEDURE — 99396 PREV VISIT EST AGE 40-64: CPT | Performed by: NURSE PRACTITIONER

## 2023-10-24 PROCEDURE — 87491 CHLMYD TRACH DNA AMP PROBE: CPT

## 2023-10-24 PROCEDURE — 87798 DETECT AGENT NOS DNA AMP: CPT

## 2023-10-24 PROCEDURE — 3078F DIAST BP <80 MM HG: CPT | Performed by: NURSE PRACTITIONER

## 2023-10-24 PROCEDURE — 36415 COLL VENOUS BLD VENIPUNCTURE: CPT

## 2023-10-24 PROCEDURE — 88175 CYTOPATH C/V AUTO FLUID REDO: CPT

## 2023-10-24 PROCEDURE — 80074 ACUTE HEPATITIS PANEL: CPT

## 2023-10-24 PROCEDURE — 87661 TRICHOMONAS VAGINALIS AMPLIF: CPT

## 2023-10-24 PROCEDURE — 87591 N.GONORRHOEAE DNA AMP PROB: CPT

## 2023-10-24 PROCEDURE — 86780 TREPONEMA PALLIDUM: CPT

## 2023-10-24 PROCEDURE — 87389 HIV-1 AG W/HIV-1&-2 AB AG IA: CPT

## 2023-10-24 PROCEDURE — 3074F SYST BP LT 130 MM HG: CPT | Performed by: NURSE PRACTITIONER

## 2023-10-24 RX ORDER — IBUPROFEN 800 MG/1
800 TABLET ORAL EVERY 8 HOURS PRN
Qty: 120 TABLET | Status: CANCELLED | OUTPATIENT
Start: 2023-10-24

## 2023-10-24 RX ORDER — TOPIRAMATE 25 MG/1
25 TABLET ORAL DAILY
COMMUNITY
Start: 2023-10-18

## 2023-10-24 RX ORDER — TRIAMTERENE AND HYDROCHLOROTHIAZIDE 37.5; 25 MG/1; MG/1
1 TABLET ORAL DAILY
COMMUNITY
Start: 2023-10-18 | End: 2023-10-24 | Stop reason: ALTCHOICE

## 2023-10-24 RX ORDER — ONDANSETRON 4 MG/1
TABLET, FILM COATED ORAL
COMMUNITY
Start: 2023-09-20

## 2023-10-24 SDOH — ECONOMIC STABILITY: HOUSING INSECURITY
IN THE LAST 12 MONTHS, WAS THERE A TIME WHEN YOU DID NOT HAVE A STEADY PLACE TO SLEEP OR SLEPT IN A SHELTER (INCLUDING NOW)?: NO

## 2023-10-24 SDOH — ECONOMIC STABILITY: INCOME INSECURITY: HOW HARD IS IT FOR YOU TO PAY FOR THE VERY BASICS LIKE FOOD, HOUSING, MEDICAL CARE, AND HEATING?: NOT HARD AT ALL

## 2023-10-24 SDOH — ECONOMIC STABILITY: FOOD INSECURITY: WITHIN THE PAST 12 MONTHS, YOU WORRIED THAT YOUR FOOD WOULD RUN OUT BEFORE YOU GOT MONEY TO BUY MORE.: NEVER TRUE

## 2023-10-24 SDOH — ECONOMIC STABILITY: FOOD INSECURITY: WITHIN THE PAST 12 MONTHS, THE FOOD YOU BOUGHT JUST DIDN'T LAST AND YOU DIDN'T HAVE MONEY TO GET MORE.: NEVER TRUE

## 2023-10-24 ASSESSMENT — ENCOUNTER SYMPTOMS
SHORTNESS OF BREATH: 0
GASTROINTESTINAL NEGATIVE: 1
RESPIRATORY NEGATIVE: 1
CHEST TIGHTNESS: 0
WHEEZING: 0

## 2023-10-24 ASSESSMENT — PATIENT HEALTH QUESTIONNAIRE - PHQ9
SUM OF ALL RESPONSES TO PHQ QUESTIONS 1-9: 0
SUM OF ALL RESPONSES TO PHQ9 QUESTIONS 1 & 2: 0
SUM OF ALL RESPONSES TO PHQ QUESTIONS 1-9: 0
1. LITTLE INTEREST OR PLEASURE IN DOING THINGS: 0
SUM OF ALL RESPONSES TO PHQ QUESTIONS 1-9: 0
SUM OF ALL RESPONSES TO PHQ QUESTIONS 1-9: 0
2. FEELING DOWN, DEPRESSED OR HOPELESS: 0

## 2023-10-24 ASSESSMENT — VISUAL ACUITY
OS_CC: 20/25
OD_CC: 20/25

## 2023-10-24 NOTE — PROGRESS NOTES
Patient declined vaccines today. Ramy Cortez presents today for   Chief Complaint   Patient presents with    Annual Exam       Is someone accompanying this pt? No     Is the patient using any DME equipment during OV? No     Depression Screening:       No data to display                Learning Assessment:  No flowsheet data found. Health Maintenance reviewed and discussed and ordered per Provider. Health Maintenance Due   Topic Date Due    Hepatitis B vaccine (1 of 3 - 3-dose series) Never done    DTaP/Tdap/Td vaccine (1 - Tdap) Never done    Shingles vaccine (1 of 2) Never done    COVID-19 Vaccine (3 - Pfizer series) 06/17/2021    Breast cancer screen  11/27/2022    Lipids  03/03/2023    Depression Screen  07/21/2023    Flu vaccine (1) Never done   . Coordination of Care:  1. Have you been to the ER, urgent care clinic since your last visit? Hospitalized since your last visit? No     2. Have you seen or consulted any other health care providers outside of the [address] [place] [address] since your last visit? Include any pap smears or colon screening. Weight management     3. For patients aged 43-73: Has the patient had a colonoscopy / FIT/ Cologuard? Yes      If the patient is female:    4. For patients aged 43-66: Has the patient had a mammogram within the past 2 years? No       5. For patients aged 21-65: Has the patient had a pap smear?  no
membrane normal.      Left Ear: Tympanic membrane normal.      Nose: Nose normal.      Mouth/Throat:      Mouth: Mucous membranes are moist.   Eyes:      Extraocular Movements: Extraocular movements intact. Pupils: Pupils are equal, round, and reactive to light. Cardiovascular:      Rate and Rhythm: Normal rate and regular rhythm. Pulses: Normal pulses. Heart sounds: Normal heart sounds. No murmur heard. Pulmonary:      Effort: Pulmonary effort is normal. No respiratory distress. Breath sounds: Normal breath sounds. No wheezing. Chest:   Breasts:     Right: Normal.      Left: Normal.      Comments: Bilateral scars from breast reduction surgery   Abdominal:      General: Bowel sounds are normal. There is no distension. Palpations: Abdomen is soft. There is no mass. Tenderness: There is no abdominal tenderness. There is no right CVA tenderness. Genitourinary:     General: Normal vulva. Exam position: Supine. Vagina: No signs of injury and foreign body. Vaginal discharge present. No erythema, tenderness, bleeding, lesions or prolapsed vaginal walls. Cervix: No cervical motion tenderness, friability, erythema or eversion. Uterus: Normal.       Adnexa: Right adnexa normal and left adnexa normal.      Rectum: Normal.   Musculoskeletal:         General: No tenderness. Normal range of motion. Cervical back: Normal range of motion. Right lower leg: No edema. Left lower leg: No edema. Skin:     General: Skin is warm. Capillary Refill: Capillary refill takes less than 2 seconds. Neurological:      General: No focal deficit present. Mental Status: She is alert and oriented to person, place, and time. Psychiatric:         Mood and Affect: Mood normal.                 I have discussed the diagnosis with the patient and the intended plan as seen in the above orders.   The patient has received an After-Visit Summary and questions were answered

## 2023-10-25 LAB
-: NORMAL
BV DNA PNL VAG NAA+PROBE: POSITIVE
C GLABRATA DNA VAG QL NAA+PROBE: NEGATIVE
C TRACH RRNA SPEC QL NAA+PROBE: NEGATIVE
CANDIDA DNA VAG QL NAA+PROBE: NEGATIVE
HAV IGM SER QL: NEGATIVE
HBV CORE IGM SER QL: NEGATIVE
HBV SURFACE AG SER QL: <0.1 INDEX
HBV SURFACE AG SER QL: NEGATIVE
HCV AB SER IA-ACNC: 0.04 INDEX
HCV AB SERPL QL IA: NEGATIVE
HEPATITIS C COMMENT: NORMAL
HIV 1+2 AB+HIV1 P24 AG SERPL QL IA: NONREACTIVE
HIV 1/2 RESULT COMMENT: NORMAL
N GONORRHOEA RRNA SPEC QL NAA+PROBE: NEGATIVE
SPECIMEN SOURCE: NORMAL
T PALLIDUM AB SER QL IA: NONREACTIVE
T VAGINALIS RRNA SPEC QL NAA+PROBE: NEGATIVE

## 2023-10-26 ENCOUNTER — TELEPHONE (OUTPATIENT)
Facility: CLINIC | Age: 53
End: 2023-10-26

## 2023-10-26 DIAGNOSIS — N76.0 BACTERIAL VAGINOSIS: Primary | ICD-10-CM

## 2023-10-26 DIAGNOSIS — B96.89 BACTERIAL VAGINOSIS: Primary | ICD-10-CM

## 2023-10-26 RX ORDER — METRONIDAZOLE 500 MG/1
500 TABLET ORAL 2 TIMES DAILY
Qty: 14 TABLET | Refills: 0 | Status: SHIPPED | OUTPATIENT
Start: 2023-10-26 | End: 2023-11-02

## 2023-10-26 NOTE — TELEPHONE ENCOUNTER
Notified patient of positive bacterial vaginosis results and that Flagyl sent to pharmacy. Advised patient to not drink alcohol while on the antibiotic. Reviewed other normal STD results and advised that PAP results are not back at this time; pt voiced understanding and will  medication today.